# Patient Record
Sex: FEMALE | Race: WHITE | Employment: OTHER | URBAN - METROPOLITAN AREA
[De-identification: names, ages, dates, MRNs, and addresses within clinical notes are randomized per-mention and may not be internally consistent; named-entity substitution may affect disease eponyms.]

---

## 2017-01-04 ENCOUNTER — HOSPITAL ENCOUNTER (OUTPATIENT)
Dept: PHYSICAL THERAPY | Age: 67
Discharge: HOME | End: 2017-01-04
Admitting: ORTHOPAEDIC SURGERY

## 2017-01-24 ENCOUNTER — OFFICE VISIT (OUTPATIENT)
Dept: ORTHOPEDIC SURGERY | Age: 67
End: 2017-01-24

## 2017-01-24 VITALS
DIASTOLIC BLOOD PRESSURE: 78 MMHG | HEIGHT: 63 IN | SYSTOLIC BLOOD PRESSURE: 133 MMHG | HEART RATE: 80 BPM | BODY MASS INDEX: 28.71 KG/M2 | WEIGHT: 162.04 LBS

## 2017-01-24 DIAGNOSIS — M17.11 PRIMARY OSTEOARTHRITIS OF RIGHT KNEE: ICD-10-CM

## 2017-01-24 DIAGNOSIS — Z98.890 STATUS POST ARTHROSCOPY OF RIGHT KNEE: Primary | ICD-10-CM

## 2017-01-24 PROCEDURE — 99024 POSTOP FOLLOW-UP VISIT: CPT | Performed by: PHYSICIAN ASSISTANT

## 2017-01-25 ENCOUNTER — HOSPITAL ENCOUNTER (OUTPATIENT)
Dept: PHYSICAL THERAPY | Age: 67
Discharge: HOME | End: 2017-01-25
Admitting: ORTHOPAEDIC SURGERY

## 2017-02-01 ENCOUNTER — HOSPITAL ENCOUNTER (OUTPATIENT)
Dept: PHYSICAL THERAPY | Age: 67
Discharge: HOME | End: 2017-02-01
Admitting: ORTHOPAEDIC SURGERY

## 2017-02-08 ENCOUNTER — HOSPITAL ENCOUNTER (OUTPATIENT)
Dept: PHYSICAL THERAPY | Age: 67
Discharge: HOME | End: 2017-02-08
Admitting: ORTHOPAEDIC SURGERY

## 2017-02-15 ENCOUNTER — HOSPITAL ENCOUNTER (OUTPATIENT)
Dept: PHYSICAL THERAPY | Age: 67
Discharge: HOME | End: 2017-02-15
Admitting: ORTHOPAEDIC SURGERY

## 2017-02-22 ENCOUNTER — HOSPITAL ENCOUNTER (OUTPATIENT)
Dept: PHYSICAL THERAPY | Age: 67
Discharge: HOME | End: 2017-02-22
Admitting: ORTHOPAEDIC SURGERY

## 2017-02-28 ENCOUNTER — OFFICE VISIT (OUTPATIENT)
Dept: ORTHOPEDIC SURGERY | Age: 67
End: 2017-02-28

## 2017-02-28 VITALS — BODY MASS INDEX: 28.71 KG/M2 | WEIGHT: 162.04 LBS | HEIGHT: 63 IN

## 2017-02-28 DIAGNOSIS — M76.51 PATELLAR TENDINITIS OF RIGHT KNEE: Primary | ICD-10-CM

## 2017-02-28 DIAGNOSIS — M17.11 PRIMARY OSTEOARTHRITIS OF RIGHT KNEE: ICD-10-CM

## 2017-02-28 PROCEDURE — 20611 DRAIN/INJ JOINT/BURSA W/US: CPT | Performed by: PHYSICIAN ASSISTANT

## 2017-02-28 PROCEDURE — 99024 POSTOP FOLLOW-UP VISIT: CPT | Performed by: PHYSICIAN ASSISTANT

## 2017-02-28 RX ORDER — DEXAMETHASONE SODIUM PHOSPHATE 4 MG/ML
INJECTION, SOLUTION INTRA-ARTICULAR; INTRALESIONAL; INTRAMUSCULAR; INTRAVENOUS; SOFT TISSUE
Qty: 30 ML | Refills: 0 | Status: SHIPPED | OUTPATIENT
Start: 2017-02-28 | End: 2018-04-17 | Stop reason: ALTCHOICE

## 2017-03-03 ENCOUNTER — HOSPITAL ENCOUNTER (OUTPATIENT)
Dept: PHYSICAL THERAPY | Age: 67
Discharge: HOME | End: 2017-03-03
Admitting: ORTHOPAEDIC SURGERY

## 2017-03-08 ENCOUNTER — HOSPITAL ENCOUNTER (OUTPATIENT)
Dept: PHYSICAL THERAPY | Age: 67
Discharge: HOME | End: 2017-03-08
Admitting: ORTHOPAEDIC SURGERY

## 2017-03-15 ENCOUNTER — HOSPITAL ENCOUNTER (OUTPATIENT)
Dept: PHYSICAL THERAPY | Age: 67
Discharge: HOME | End: 2017-03-15
Admitting: ORTHOPAEDIC SURGERY

## 2017-06-20 ENCOUNTER — TELEPHONE (OUTPATIENT)
Dept: ORTHOPEDIC SURGERY | Age: 67
End: 2017-06-20

## 2018-04-17 ENCOUNTER — OFFICE VISIT (OUTPATIENT)
Dept: ORTHOPEDIC SURGERY | Age: 68
End: 2018-04-17

## 2018-04-17 VITALS — BODY MASS INDEX: 28.71 KG/M2 | WEIGHT: 162.04 LBS | HEIGHT: 63 IN

## 2018-04-17 DIAGNOSIS — M19.079 ARTHRITIS OF MIDFOOT: ICD-10-CM

## 2018-04-17 DIAGNOSIS — M79.671 FOOT PAIN, RIGHT: Primary | ICD-10-CM

## 2018-04-17 PROCEDURE — 1090F PRES/ABSN URINE INCON ASSESS: CPT | Performed by: PHYSICIAN ASSISTANT

## 2018-04-17 PROCEDURE — 4040F PNEUMOC VAC/ADMIN/RCVD: CPT | Performed by: PHYSICIAN ASSISTANT

## 2018-04-17 PROCEDURE — G8400 PT W/DXA NO RESULTS DOC: HCPCS | Performed by: PHYSICIAN ASSISTANT

## 2018-04-17 PROCEDURE — 1036F TOBACCO NON-USER: CPT | Performed by: PHYSICIAN ASSISTANT

## 2018-04-17 PROCEDURE — 99213 OFFICE O/P EST LOW 20 MIN: CPT | Performed by: PHYSICIAN ASSISTANT

## 2018-04-17 PROCEDURE — G8419 CALC BMI OUT NRM PARAM NOF/U: HCPCS | Performed by: PHYSICIAN ASSISTANT

## 2018-04-17 PROCEDURE — 3014F SCREEN MAMMO DOC REV: CPT | Performed by: PHYSICIAN ASSISTANT

## 2018-04-17 PROCEDURE — 3017F COLORECTAL CA SCREEN DOC REV: CPT | Performed by: PHYSICIAN ASSISTANT

## 2018-04-17 PROCEDURE — G8427 DOCREV CUR MEDS BY ELIG CLIN: HCPCS | Performed by: PHYSICIAN ASSISTANT

## 2018-04-17 PROCEDURE — 1123F ACP DISCUSS/DSCN MKR DOCD: CPT | Performed by: PHYSICIAN ASSISTANT

## 2018-04-17 PROCEDURE — L4361 PNEUMA/VAC WALK BOOT PRE OTS: HCPCS | Performed by: PHYSICIAN ASSISTANT

## 2018-04-17 RX ORDER — HYDROCHLOROTHIAZIDE 25 MG/1
25 TABLET ORAL DAILY
COMMUNITY
Start: 2018-02-19

## 2018-04-17 RX ORDER — MELOXICAM 7.5 MG/1
TABLET ORAL
COMMUNITY
Start: 2018-04-10 | End: 2019-05-29

## 2018-05-08 ENCOUNTER — OFFICE VISIT (OUTPATIENT)
Dept: ORTHOPEDIC SURGERY | Age: 68
End: 2018-05-08

## 2018-05-08 VITALS — HEART RATE: 91 BPM | SYSTOLIC BLOOD PRESSURE: 135 MMHG | DIASTOLIC BLOOD PRESSURE: 75 MMHG

## 2018-05-08 DIAGNOSIS — M25.571 ACUTE RIGHT ANKLE PAIN: Primary | ICD-10-CM

## 2018-05-08 PROCEDURE — G8400 PT W/DXA NO RESULTS DOC: HCPCS | Performed by: ORTHOPAEDIC SURGERY

## 2018-05-08 PROCEDURE — 4040F PNEUMOC VAC/ADMIN/RCVD: CPT | Performed by: ORTHOPAEDIC SURGERY

## 2018-05-08 PROCEDURE — 1123F ACP DISCUSS/DSCN MKR DOCD: CPT | Performed by: ORTHOPAEDIC SURGERY

## 2018-05-08 PROCEDURE — L1902 AFO ANKLE GAUNTLET PRE OTS: HCPCS | Performed by: ORTHOPAEDIC SURGERY

## 2018-05-08 PROCEDURE — 1036F TOBACCO NON-USER: CPT | Performed by: ORTHOPAEDIC SURGERY

## 2018-05-08 PROCEDURE — 3017F COLORECTAL CA SCREEN DOC REV: CPT | Performed by: ORTHOPAEDIC SURGERY

## 2018-05-08 PROCEDURE — 99214 OFFICE O/P EST MOD 30 MIN: CPT | Performed by: ORTHOPAEDIC SURGERY

## 2018-05-08 PROCEDURE — 1090F PRES/ABSN URINE INCON ASSESS: CPT | Performed by: ORTHOPAEDIC SURGERY

## 2018-05-08 PROCEDURE — G8428 CUR MEDS NOT DOCUMENT: HCPCS | Performed by: ORTHOPAEDIC SURGERY

## 2018-05-08 PROCEDURE — G8419 CALC BMI OUT NRM PARAM NOF/U: HCPCS | Performed by: ORTHOPAEDIC SURGERY

## 2019-05-29 ENCOUNTER — OFFICE VISIT (OUTPATIENT)
Dept: ORTHOPEDIC SURGERY | Age: 69
End: 2019-05-29
Payer: MEDICARE

## 2019-05-29 VITALS
DIASTOLIC BLOOD PRESSURE: 79 MMHG | BODY MASS INDEX: 28.71 KG/M2 | SYSTOLIC BLOOD PRESSURE: 137 MMHG | HEIGHT: 63 IN | WEIGHT: 162.04 LBS | HEART RATE: 93 BPM

## 2019-05-29 DIAGNOSIS — M79.671 FOOT PAIN, RIGHT: Primary | ICD-10-CM

## 2019-05-29 PROCEDURE — L3170 FOOT PLAS HEEL STABI PRE OTS: HCPCS | Performed by: ORTHOPAEDIC SURGERY

## 2019-05-29 PROCEDURE — 1090F PRES/ABSN URINE INCON ASSESS: CPT | Performed by: ORTHOPAEDIC SURGERY

## 2019-05-29 PROCEDURE — G8419 CALC BMI OUT NRM PARAM NOF/U: HCPCS | Performed by: ORTHOPAEDIC SURGERY

## 2019-05-29 PROCEDURE — G8400 PT W/DXA NO RESULTS DOC: HCPCS | Performed by: ORTHOPAEDIC SURGERY

## 2019-05-29 PROCEDURE — 1123F ACP DISCUSS/DSCN MKR DOCD: CPT | Performed by: ORTHOPAEDIC SURGERY

## 2019-05-29 PROCEDURE — 3017F COLORECTAL CA SCREEN DOC REV: CPT | Performed by: ORTHOPAEDIC SURGERY

## 2019-05-29 PROCEDURE — G8428 CUR MEDS NOT DOCUMENT: HCPCS | Performed by: ORTHOPAEDIC SURGERY

## 2019-05-29 PROCEDURE — 1036F TOBACCO NON-USER: CPT | Performed by: ORTHOPAEDIC SURGERY

## 2019-05-29 PROCEDURE — 4040F PNEUMOC VAC/ADMIN/RCVD: CPT | Performed by: ORTHOPAEDIC SURGERY

## 2019-05-29 PROCEDURE — 99213 OFFICE O/P EST LOW 20 MIN: CPT | Performed by: ORTHOPAEDIC SURGERY

## 2019-05-29 RX ORDER — MELOXICAM 15 MG/1
15 TABLET ORAL DAILY
Qty: 30 TABLET | Refills: 2 | Status: SHIPPED | OUTPATIENT
Start: 2019-05-29 | End: 2019-08-20 | Stop reason: SDUPTHER

## 2019-06-06 ENCOUNTER — HOSPITAL ENCOUNTER (OUTPATIENT)
Dept: PHYSICAL THERAPY | Age: 69
Setting detail: THERAPIES SERIES
Discharge: HOME | End: 2019-06-06
Payer: MEDICARE

## 2019-06-06 PROCEDURE — 97110 THERAPEUTIC EXERCISES: CPT | Performed by: PHYSICAL THERAPIST

## 2019-06-06 PROCEDURE — 97161 PT EVAL LOW COMPLEX 20 MIN: CPT | Performed by: PHYSICAL THERAPIST

## 2019-06-06 PROCEDURE — G0283 ELEC STIM OTHER THAN WOUND: HCPCS | Performed by: PHYSICAL THERAPIST

## 2019-06-06 NOTE — PLAN OF CARE
[address] [facility] and [address] [place] [address] [facility] Marlee Bravo, [address] [facility] [address]  Phone: [phone]   Fax:     [phone]       Authur Pollen    Dear  Dr Prema Santiago,    We had the pleasure of evaluating the following patient for physical therapy services at [address] [place] [address]. A summary of our findings can be found in the initial assessment below. This includes our plan of care. If you have any questions or concerns regarding these findings, please do not hesitate to contact me at the office phone number checked above.   Thank you for the referral.       Physician Signature:_______________________________Date:__________________  By signing above (or electronic signature), therapists plan is approved by physician      Patient: Rehana Belcher   : 1950   MRN: [id number]  Referring Physician:  Prema Santiago      Evaluation Date: 2019      Medical Diagnosis Information:    M79.671 (ICD-10-CM) - Foot pain, right                                             Insurance information:  [facility]     Precautions/ Contra-indications: none  Latex Allergy:  [x]NO      []YES  Preferred Language for Healthcare:   [x]English       []other:    SUBJECTIVE: Patient stated complaint:  R heel pain  For several months and not getting better    Relevant Medical History:none  Functional Disability Index: lefs 70%    Pain Scale: 5-8/10  Easing factors: visco heels are helping a lot, NWB not moving  Provocative factors: walking, steps, incline, DF     Type: []Constant   [x]Intermittent  []Radiating []Localized []other:     Numbness/Tingling: none    Occupation/School:retired    Living Status/Prior Level of Function: Independent with ADLs and IADLs, walking, bicycle    OBJECTIVE:     AROM LEFT RIGHT   HIP Flex     HIP Abd     HIP Ext     HIP IR     HIP ER     Knee ext     Knee Flex     Ankle PF  50 []Other:   []Other:          Barriers to/and or personal factors that will affect rehab potential:              []Age  []Sex              []Motivation/Lack of Motivation                        []Co-Morbidities              []Cognitive Function, education/learning barriers              []Environmental, home barriers              []profession/work barriers  []past PT/medical experience  []other:  Justification:     Falls Risk Assessment (30 days): 0  [x] Falls Risk assessed and no intervention required. [] Falls Risk assessed and Patient requires intervention due to being higher risk   TUG score (>12s at risk):     [] Falls education provided, including       G-Codes:       ASSESSMENT: pain with walking has decreased with visco heels.    Functional Impairments:     []Noted lumbar/proximal hip/LE joint hypomobility   [x]Decreased LE functional ROM   []Decreased core/proximal hip strength and neuromuscular control   []Decreased LE functional strength   [x]Reduced balance/proprioceptive control   []other:      Functional Activity Limitations (from functional questionnaire and intake)   []Reduced ability to tolerate prolonged functional positions   []Reduced ability or difficulty with changes of positions or transfers between positions   []Reduced ability to maintain good posture and demonstrate good body mechanics with sitting, bending, and lifting   []Reduced ability to sleep   [] Reduced ability or tolerance with driving and/or computer work   []Reduced ability to perform lifting, carrying tasks   []Reduced ability to squat   []Reduced ability to forward bend   [x]Reduced ability to ambulate prolonged functional periods/distances/surfaces   [x]Reduced ability to ascend/descend stairs   []Reduced ability to run, hop, cut or jump   []other:    Participation Restrictions   []Reduced participation in self care activities   [x]Reduced participation in home management activities   []Reduced participation in work activities   [x]Reduced participation in social activities. [x]Reduced participation in sport/recreation activities. Classification :    []Signs/symptoms consistent with post-surgical status including decreased ROM, strength and function. []Signs/symptoms consistent with joint sprain/strain   []Signs/symptoms consistent with patella-femoral syndrome   []Signs/symptoms consistent with knee OA/hip OA   []Signs/symptoms consistent with internal derangement of knee/Hip   []Signs/symptoms consistent with functional hip weakness/NMR control      [x]Signs/symptoms consistent with tendinitis/tendinosis    []signs/symptoms consistent with pathology which may benefit from Dry needling      []other:      Prognosis/Rehab Potential:      []Excellent   [x]Good    []Fair   []Poor    Tolerance of evaluation/treatment:    []Excellent   [x]Good    []Fair   []Poor    Physical Therapy Evaluation Complexity Justification  [x] A history of present problem with:  [x] no personal factors and/or comorbidities that impact the plan of care;  []1-2 personal factors and/or comorbidities that impact the plan of care  []3 personal factors and/or comorbidities that impact the plan of care  [x] An examination of body systems using standardized tests and measures addressing any of the following: body structures and functions (impairments), activity limitations, and/or participation restrictions;:  [] a total of 1-2 or more elements   [x] a total of 3 or more elements   [] a total of 4 or more elements   [x] A clinical presentation with:  [x] stable and/or uncomplicated characteristics   [] evolving clinical presentation with changing characteristics  [] unstable and unpredictable characteristics;   [x] Clinical decision making of [x] low, [] moderate, [] high complexity using standardized patient assessment instrument and/or measurable assessment of functional outcome.     [x] EVAL (LOW) 73392 (typically 20 minutes face-to-face)  [] EVAL (MOD) 66675 (typically 30 minutes face-to-face)  [] EVAL (HIGH) 10163 (typically 45 minutes face-to-face)  [] RE-EVAL     PLAN:   Frequency/Duration:  1 days per week for 10 Weeks:  Interventions:  [x]  Therapeutic exercise including: strength training, ROM, for Lower extremity and core   [x]  NMR activation and proprioception for LE, Glutes and Core   [x]  Manual therapy as indicated for LE, Hip and spine to include: Dry Needling/IASTM, STM, PROM, Gr I-IV mobilizations, manipulation. [x] Modalities as needed that may include: thermal agents, E-stim, Biofeedback, US, iontophoresis as indicated  [x] Patient education on joint protection, postural re-education, activity modification, progression of HEP. HEP instruction: (see scanned forms)    GOALS:  Patient stated goal: no pain    Therapist goals for Patient:   Short Term Goals: To be achieved in: 2 weeks  1. Independent in HEP and progression per patient tolerance, in order to prevent re-injury. 2. Patient will have a decrease in pain to facilitate improvement in movement, function, and ADLs as indicated by Functional Deficits. Long Term Goals: To be achieved in: 10 weeks  1. Disability index score of 30% or less for the LEFS to assist with reaching prior level of function. 2. Patient will demonstrate increased AROM to +10 degrees DF to allow for proper joint functioning as indicated by patients Functional Deficits. 3. Patient will demonstrate an increase in Strength to good proximal hip strength and control, within 5/5 MMT in LE to allow for proper functional mobility as indicated by patients Functional Deficits. 4. Patient will return to full functional activities without increased symptoms or restriction.    5. Pain free gait 100% of time(patient specific functional goal)       Electronically signed by:  Loulou Weaver, PT

## 2019-06-06 NOTE — FLOWSHEET NOTE
activities related to improving balance, coordination, kinesthetic sense, posture, motor skill, proprioception and motor activation to allow for proper function of core, proximal hip and LE with self care and ADLs  [] (81652) Gait Re-education- Provided training and instruction to the patient for proper LE, core and proximal hip recruitment and positioning and eccentric body weight control with ambulation re-education including up and down stairs     Home Exercise Program:    [x] (54419) Reviewed/Progressed HEP activities related to strengthening, flexibility, endurance, ROM of core, proximal hip and LE for functional self-care, mobility, lifting and ambulation/stair navigation   [] (19802)Reviewed/Progressed HEP activities related to improving balance, coordination, kinesthetic sense, posture, motor skill, proprioception of core, proximal hip and LE for self care, mobility, lifting, and ambulation/stair navigation      Manual Treatments:  PROM / STM / Oscillations-Mobs:  G-I, II, III, IV (PA's, Inf., Post.)  [] (02005) Provided manual therapy to mobilize LE, proximal hip and/or LS spine soft tissue/joints for the purpose of modulating pain, promoting relaxation,  increasing ROM, reducing/eliminating soft tissue swelling/inflammation/restriction, improving soft tissue extensibility and allowing for proper ROM for normal function with self care, mobility, lifting and ambulation. Modalities:  hivolt CP 10m    Charges:  Timed Code Treatment Minutes: 15   Total Treatment Minutes: 45     [x] EVAL (LOW) 78890 (typically 20 minutes face-to-face)    [x] KT(68425) x      [] IONTO  [] NMR (01797) x      [] VASO  [] Manual (80874) x       [] Other:  [] TA x       [] Mech Traction (41508)  [] ES(attended) (13243)      [x] ES (un) (20414):     GOALS: Patient stated goal: no pain     Therapist goals for Patient:   Short Term Goals: To be achieved in: 2 weeks  1.  Independent in HEP and progression per patient tolerance, in order to prevent re-injury. 2. Patient will have a decrease in pain to facilitate improvement in movement, function, and ADLs as indicated by Functional Deficits.     Long Term Goals: To be achieved in: 10 weeks  1. Disability index score of 30% or less for the LEFS to assist with reaching prior level of function. 2. Patient will demonstrate increased AROM to +10 degrees DF to allow for proper joint functioning as indicated by patients Functional Deficits. 3. Patient will demonstrate an increase in Strength to good proximal hip strength and control, within 5/5 MMT in LE to allow for proper functional mobility as indicated by patients Functional Deficits. 4. Patient will return to full functional activities without increased symptoms or restriction. 5. Pain free gait 100% of time(patient specific functional goal)                Progression Towards Functional goals:  [] Patient is progressing as expected towards functional goals listed. [] Progression is slowed due to complexities listed. [] Progression has been slowed due to co-morbidities.   [x] Plan just implemented, too soon to assess goals progression  [] Other:     ASSESSMENT:  See eval    Treatment/Activity Tolerance:  [x] Patient tolerated treatment well [] Patient limited by fatique  [] Patient limited by pain  [] Patient limited by other medical complications  [] Other:     Prognosis: [] Good [] Fair  [] Poor    Patient Requires Follow-up: [x] Yes  [] No    PLAN: See eval  [] Continue per plan of care [] Alter current plan (see comments)  [x] Plan of care initiated [] Hold pending MD visit [] Discharge    Electronically signed by: Heidi Bullard PT

## 2019-06-10 ENCOUNTER — HOSPITAL ENCOUNTER (OUTPATIENT)
Dept: PHYSICAL THERAPY | Age: 69
Setting detail: THERAPIES SERIES
Discharge: HOME | End: 2019-06-10
Payer: MEDICARE

## 2019-06-10 PROCEDURE — 97110 THERAPEUTIC EXERCISES: CPT | Performed by: PHYSICAL THERAPIST

## 2019-06-10 NOTE — FLOWSHEET NOTE
[facility]    Physical Therapy Daily Treatment Note  Date:  6/10/2019    Patient Name:  Yvonne Lewis    :  1950  MRN: [id number]  Restrictions/Precautions:    Medical/Treatment Diagnosis Information:  ·   M79.671 (ICD-10-CM) - Foot pain, right  ·  achilles tendonitis  Insurance/Certification information:   [facility]  Physician Information:   [id number] of care signed (Y/N):     Date of Patient follow up with Physician:     G-Code (if applicable):      Date G-Code Applied:     lefs 70%    Progress Note: [x]  Yes  []  No  Next due by: Visit #10       Latex Allergy:  [x]NO      []YES  Preferred Language for Healthcare:   [x]English       []other:    Visit # Insurance Allowable   2 [facility]     Pain level:  5-810     SUBJECTIVE: sore. Thinks she may have over done the ex    OBJECTIVE: See eval  Observation:   Test measurements:      RESTRICTIONS/PRECAUTIONS: none    Exercises/Interventions:     Therapeutic Ex Sets/sec Reps Notes HEP   Belt stretch G/S  SLR  SSLR  Ankle TB 4 way       purple 3x30s  x25  x20  x15 ea       Max cues ankle only                                                                                                                 Manual Intervention                                                 NMR re-education                                                                          Therapeutic Exercise and NMR EXR  [x] (81380) Provided verbal/tactile cueing for activities related to strengthening, flexibility, endurance, ROM for improvements in LE, proximal hip, and core control with self care, mobility, lifting, ambulation.  [] (47516) Provided verbal/tactile cueing for activities related to improving balance, coordination, kinesthetic sense, posture, motor skill, proprioception  to assist with LE, proximal hip, and core control in self care, mobility, lifting, ambulation and eccentric single leg control.      NMR and Therapeutic Activities:    [] (82156 or 04389) Provided verbal/tactile cueing for activities related to improving balance, coordination, kinesthetic sense, posture, motor skill, proprioception and motor activation to allow for proper function of core, proximal hip and LE with self care and ADLs  [] (20156) Gait Re-education- Provided training and instruction to the patient for proper LE, core and proximal hip recruitment and positioning and eccentric body weight control with ambulation re-education including up and down stairs     Home Exercise Program:    [x] (86318) Reviewed/Progressed HEP activities related to strengthening, flexibility, endurance, ROM of core, proximal hip and LE for functional self-care, mobility, lifting and ambulation/stair navigation   [] (54065)Reviewed/Progressed HEP activities related to improving balance, coordination, kinesthetic sense, posture, motor skill, proprioception of core, proximal hip and LE for self care, mobility, lifting, and ambulation/stair navigation      Manual Treatments:  PROM / STM / Oscillations-Mobs:  G-I, II, III, IV (PA's, Inf., Post.)  [] (67203) Provided manual therapy to mobilize LE, proximal hip and/or LS spine soft tissue/joints for the purpose of modulating pain, promoting relaxation,  increasing ROM, reducing/eliminating soft tissue swelling/inflammation/restriction, improving soft tissue extensibility and allowing for proper ROM for normal function with self care, mobility, lifting and ambulation. Modalities:  has home TENS unit    Charges:  Timed Code Treatment Minutes: 30   Total Treatment Minutes: 45     [] EVAL (LOW) 42453 (typically 20 minutes face-to-face)    [x] IX(00999) x  2   [] IONTO  [] NMR (36674) x      [] VASO  [] Manual (57637) x       [] Other:  [] TA x       [] Mech Traction (10119)  [] ES(attended) (21757)      [] ES (un) (57985):     GOALS: Patient stated goal: no pain     Therapist goals for Patient:   Short Term Goals:  To be achieved in: 2 weeks  1. Independent in HEP and progression per patient tolerance, in order to prevent re-injury. 2. Patient will have a decrease in pain to facilitate improvement in movement, function, and ADLs as indicated by Functional Deficits.     Long Term Goals: To be achieved in: 10 weeks  1. Disability index score of 30% or less for the LEFS to assist with reaching prior level of function. 2. Patient will demonstrate increased AROM to +10 degrees DF to allow for proper joint functioning as indicated by patients Functional Deficits. 3. Patient will demonstrate an increase in Strength to good proximal hip strength and control, within 5/5 MMT in LE to allow for proper functional mobility as indicated by patients Functional Deficits. 4. Patient will return to full functional activities without increased symptoms or restriction. 5. Pain free gait 100% of time(patient specific functional goal)                Progression Towards Functional goals:  [x] Patient is progressing as expected towards functional goals listed. [] Progression is slowed due to complexities listed. [] Progression has been slowed due to co-morbidities.   [] Plan just implemented, too soon to assess goals progression  [] Other:     ASSESSMENT:  Cues not to over do ex and not to roll leg with band ex    Treatment/Activity Tolerance:  [x] Patient tolerated treatment well [] Patient limited by fatique  [] Patient limited by pain  [] Patient limited by other medical complications  [] Other:     Prognosis: [x] Good [] Fair  [] Poor    Patient Requires Follow-up: [x] Yes  [] No    PLAN:   [x] Continue per plan of care [] Alter current plan (see comments)  [] Plan of care initiated [] Hold pending MD visit [] Discharge    Electronically signed by: Ronaldo Lopez PT

## 2019-06-26 ENCOUNTER — APPOINTMENT (OUTPATIENT)
Dept: PHYSICAL THERAPY | Age: 69
End: 2019-06-26
Payer: MEDICARE

## 2019-07-01 ENCOUNTER — HOSPITAL ENCOUNTER (OUTPATIENT)
Dept: PHYSICAL THERAPY | Age: 69
Setting detail: THERAPIES SERIES
Discharge: HOME | End: 2019-07-01
Payer: MEDICARE

## 2019-07-01 PROCEDURE — 97110 THERAPEUTIC EXERCISES: CPT

## 2019-07-01 PROCEDURE — 97112 NEUROMUSCULAR REEDUCATION: CPT

## 2019-07-17 ENCOUNTER — OFFICE VISIT (OUTPATIENT)
Dept: ORTHOPEDIC SURGERY | Age: 69
End: 2019-07-17
Payer: MEDICARE

## 2019-07-17 VITALS — WEIGHT: 162.04 LBS | HEIGHT: 63 IN | BODY MASS INDEX: 28.71 KG/M2

## 2019-07-17 DIAGNOSIS — M79.671 FOOT PAIN, RIGHT: Primary | ICD-10-CM

## 2019-07-17 PROCEDURE — 4040F PNEUMOC VAC/ADMIN/RCVD: CPT | Performed by: ORTHOPAEDIC SURGERY

## 2019-07-17 PROCEDURE — 1036F TOBACCO NON-USER: CPT | Performed by: ORTHOPAEDIC SURGERY

## 2019-07-17 PROCEDURE — G8400 PT W/DXA NO RESULTS DOC: HCPCS | Performed by: ORTHOPAEDIC SURGERY

## 2019-07-17 PROCEDURE — 3017F COLORECTAL CA SCREEN DOC REV: CPT | Performed by: ORTHOPAEDIC SURGERY

## 2019-07-17 PROCEDURE — G8428 CUR MEDS NOT DOCUMENT: HCPCS | Performed by: ORTHOPAEDIC SURGERY

## 2019-07-17 PROCEDURE — 99212 OFFICE O/P EST SF 10 MIN: CPT | Performed by: ORTHOPAEDIC SURGERY

## 2019-07-17 PROCEDURE — 1090F PRES/ABSN URINE INCON ASSESS: CPT | Performed by: ORTHOPAEDIC SURGERY

## 2019-07-17 PROCEDURE — 1123F ACP DISCUSS/DSCN MKR DOCD: CPT | Performed by: ORTHOPAEDIC SURGERY

## 2019-07-17 PROCEDURE — G8419 CALC BMI OUT NRM PARAM NOF/U: HCPCS | Performed by: ORTHOPAEDIC SURGERY

## 2019-08-14 ENCOUNTER — OFFICE VISIT (OUTPATIENT)
Dept: ORTHOPEDIC SURGERY | Age: 69
End: 2019-08-14
Payer: MEDICARE

## 2019-08-14 VITALS — HEIGHT: 63 IN | WEIGHT: 162.04 LBS | BODY MASS INDEX: 28.71 KG/M2

## 2019-08-14 DIAGNOSIS — M79.671 FOOT PAIN, RIGHT: Primary | ICD-10-CM

## 2019-08-14 PROCEDURE — 99212 OFFICE O/P EST SF 10 MIN: CPT | Performed by: ORTHOPAEDIC SURGERY

## 2019-08-14 PROCEDURE — G8419 CALC BMI OUT NRM PARAM NOF/U: HCPCS | Performed by: ORTHOPAEDIC SURGERY

## 2019-08-14 PROCEDURE — 1036F TOBACCO NON-USER: CPT | Performed by: ORTHOPAEDIC SURGERY

## 2019-08-14 PROCEDURE — G8428 CUR MEDS NOT DOCUMENT: HCPCS | Performed by: ORTHOPAEDIC SURGERY

## 2019-08-14 PROCEDURE — 1123F ACP DISCUSS/DSCN MKR DOCD: CPT | Performed by: ORTHOPAEDIC SURGERY

## 2019-08-14 PROCEDURE — G8400 PT W/DXA NO RESULTS DOC: HCPCS | Performed by: ORTHOPAEDIC SURGERY

## 2019-08-14 PROCEDURE — 4040F PNEUMOC VAC/ADMIN/RCVD: CPT | Performed by: ORTHOPAEDIC SURGERY

## 2019-08-14 PROCEDURE — 1090F PRES/ABSN URINE INCON ASSESS: CPT | Performed by: ORTHOPAEDIC SURGERY

## 2019-08-14 PROCEDURE — 3017F COLORECTAL CA SCREEN DOC REV: CPT | Performed by: ORTHOPAEDIC SURGERY

## 2019-08-20 RX ORDER — MELOXICAM 15 MG/1
TABLET ORAL
Qty: 30 TABLET | Refills: 2 | Status: SHIPPED | OUTPATIENT
Start: 2019-08-20 | End: 2019-11-22 | Stop reason: SDUPTHER

## 2019-11-22 RX ORDER — MELOXICAM 15 MG/1
TABLET ORAL
Qty: 30 TABLET | Refills: 2 | Status: SHIPPED | OUTPATIENT
Start: 2019-11-22 | End: 2022-09-23

## 2020-07-14 ENCOUNTER — TELEPHONE (OUTPATIENT)
Dept: ORTHOPEDIC SURGERY | Age: 70
End: 2020-07-14

## 2022-09-19 NOTE — DISCHARGE INSTRUCTIONS
[facility]  [address], [address]  [place], [facility]  Telephone: [phone]      Fax: [phone]      Your home care company:  N/a    Your wound-care supplies will be provided by:  Patient    NAME:  Moisés Miller   YOB: 1950  PRIMARY DIAGNOSIS FOR WOUND CARE CENTER:  Non-healing surgical wound    Wound cleansing:   Do not scrub or use excessive force. Wash hands with soap and water before and after dressing changes. Prior to applying a clean dressing, cleanse wound with normal saline, wound cleanser, or mild soap and water. Ask your physician or nurse before getting the wound(s) wet in the shower. Wound care for home:    Right Achilles Wound:  Vashe to wound after cleansing wound spray 2-3 sprays into wound let sit about 60 seconds, do not rinse then apply your dressing   Mix a small dot of our TRIAD we gave you with your antibiotic ointment to your wound  Cover with a dry dressing (no adhesives)  ACE wrap to help keep dressing in place  Change dressing every day    Please note, all wounds (unless stated otherwise here) were mechanically debrided at the time of cleansing here in the wound-care center today, so a small amount of pain, drainage or bleeding from that process might be expected, and is normal.     All products for home use, including multiple products for a single wound if applicable, are medically necessary in order to achieve the best chance at timely wound healing. See provider documentation for details if needed.     Substituted dressings applied in the [facility] today, if applicable:    N/a    New orders for this week (labs, imaging, medications, etc.):    STOP AT THE [facility]ACY [facility] AND PURCHASE A BOTTLE OF VASHE AND A SPRAY BOTTLE    BRING IN YOUR OFFLOADING BOOT TO YOUR NEXT APPOINTMENT      Additional instructions for specific diagnoses:    N/a        F/U Appointment is with Dr. Isaias Granados in 1 week, on                                   at                       .     Your nurse  is Lukas Nevarez RN     If we applied slip-resistant hospital socks today, be sure to remove them at least once a day to inspect your toes or feet, even if you're not changing the wraps or dressings underneath. If you see anything concerning (redness, excess moisture, etc), please call and let us know right away. Should you experience any significant changes in your wound(s) (including redness, increased warmth, increased pain, increased drainage, odor, or fever) or have questions about your wound care, please contact the [address] [place] [address] at [phone] Monday-Thursday from 8:00 am - 4:30 pm, or Friday from 8:00 am - 2:30 pm.  If you need help with your wound outside these hours and cannot wait until we are again available, contact your home-care company (if applicable), your PCP, or go to the nearest emergency room.

## 2022-09-22 PROBLEM — M65.331 TRIGGER MIDDLE FINGER OF RIGHT HAND: Status: ACTIVE | Noted: 2020-08-28

## 2022-09-22 PROBLEM — M19.90 LOCALIZED OSTEOARTHROSIS: Status: ACTIVE | Noted: 2020-08-10

## 2022-09-22 PROBLEM — K13.79 RECURRENT ORAL ULCERS: Status: ACTIVE | Noted: 2020-04-14

## 2022-09-22 PROBLEM — G56.01 CARPAL TUNNEL SYNDROME, RIGHT: Status: ACTIVE | Noted: 2022-09-22

## 2022-09-22 PROBLEM — E66.3 OVERWEIGHT (BMI 25.0-29.9): Status: ACTIVE | Noted: 2018-08-29

## 2022-09-23 ENCOUNTER — HOSPITAL ENCOUNTER (OUTPATIENT)
Dept: WOUND CARE | Age: 72
Discharge: HOME | End: 2022-09-23
Payer: MEDICARE

## 2022-09-23 VITALS
SYSTOLIC BLOOD PRESSURE: 151 MMHG | HEIGHT: 63 IN | BODY MASS INDEX: 30.72 KG/M2 | TEMPERATURE: 97.8 F | RESPIRATION RATE: 18 BRPM | DIASTOLIC BLOOD PRESSURE: 73 MMHG | WEIGHT: 173.4 LBS | HEART RATE: 101 BPM

## 2022-09-23 DIAGNOSIS — I10 ESSENTIAL HYPERTENSION: ICD-10-CM

## 2022-09-23 DIAGNOSIS — Z14.8 HEMOCHROMATOSIS CARRIER: ICD-10-CM

## 2022-09-23 DIAGNOSIS — G47.00 INSOMNIA WITH SLEEP APNEA: ICD-10-CM

## 2022-09-23 DIAGNOSIS — T81.31XA SURGICAL WOUND DEHISCENCE, INITIAL ENCOUNTER: Primary | ICD-10-CM

## 2022-09-23 DIAGNOSIS — G47.30 INSOMNIA WITH SLEEP APNEA: ICD-10-CM

## 2022-09-23 PROCEDURE — 11042 DBRDMT SUBQ TIS 1ST 20SQCM/<: CPT | Performed by: INTERNAL MEDICINE

## 2022-09-23 PROCEDURE — 99203 OFFICE O/P NEW LOW 30 MIN: CPT | Performed by: INTERNAL MEDICINE

## 2022-09-23 PROCEDURE — 11042 DBRDMT SUBQ TIS 1ST 20SQCM/<: CPT

## 2022-09-23 PROCEDURE — 99213 OFFICE O/P EST LOW 20 MIN: CPT

## 2022-09-23 RX ORDER — BACITRACIN ZINC AND POLYMYXIN B SULFATE 500; 1000 [USP'U]/G; [USP'U]/G
OINTMENT TOPICAL ONCE
Status: CANCELLED | OUTPATIENT
Start: 2022-09-23 | End: 2022-09-23

## 2022-09-23 RX ORDER — LIDOCAINE 40 MG/G
CREAM TOPICAL ONCE
Status: DISCONTINUED | OUTPATIENT
Start: 2022-09-23 | End: 2022-09-25 | Stop reason: HOSPADM

## 2022-09-23 RX ORDER — LIDOCAINE 50 MG/G
OINTMENT TOPICAL ONCE
Status: CANCELLED | OUTPATIENT
Start: 2022-09-23 | End: 2022-09-23

## 2022-09-23 RX ORDER — ASPIRIN 81 MG/1
81 TABLET, CHEWABLE ORAL DAILY
COMMUNITY

## 2022-09-23 RX ORDER — GLIMEPIRIDE 1 MG/1
1 TABLET ORAL
COMMUNITY

## 2022-09-23 RX ORDER — LIDOCAINE HYDROCHLORIDE 40 MG/ML
SOLUTION TOPICAL ONCE
Status: CANCELLED | OUTPATIENT
Start: 2022-09-23 | End: 2022-09-23

## 2022-09-23 RX ORDER — LIDOCAINE 40 MG/G
CREAM TOPICAL ONCE
Status: CANCELLED | OUTPATIENT
Start: 2022-09-23 | End: 2022-09-23

## 2022-09-23 RX ORDER — IRBESARTAN 300 MG/1
300 TABLET ORAL NIGHTLY
COMMUNITY

## 2022-09-23 RX ORDER — LANOLIN ALCOHOL/MO/W.PET/CERES
1000 CREAM (GRAM) TOPICAL DAILY
COMMUNITY

## 2022-09-23 RX ORDER — PIOGLITAZONEHYDROCHLORIDE 30 MG/1
30 TABLET ORAL DAILY
COMMUNITY

## 2022-09-23 RX ORDER — GLUCOSAMINE/D3/BOSWELLIA SERRA 1500MG-400
5000 TABLET ORAL DAILY
COMMUNITY

## 2022-09-23 RX ORDER — ACETAMINOPHEN 160 MG
2000 TABLET,DISINTEGRATING ORAL DAILY
COMMUNITY

## 2022-09-23 NOTE — PLAN OF CARE
New patient presents with non healing surgical wound. Debridement per MD today after consent. MD instructed patient on plan of care and expectations with wound care. Patient will follow up in wound clinic as ordered. Discharge instructions reviewed with patient, all questions answered, copy given to patient. Dressings were applied to all wounds per M.D. Instructions at this visit.

## 2022-09-26 NOTE — DISCHARGE INSTRUCTIONS
[address] [facility] Physician Orders and Discharge 800 [facility]  [address], [address]  [place], [facility]  Telephone: [phone]      Fax: [phone]        Your home care company:   N/a     Your wound-care supplies will be provided by:  Patient     NAME:  Trav Mccarty   YOB: 1950  PRIMARY DIAGNOSIS FOR WOUND CARE CENTER:  Non-healing surgical wound     Wound cleansing:   Do not scrub or use excessive force. Wash hands with soap and water before and after dressing changes. Prior to applying a clean dressing, cleanse wound with normal saline, wound cleanser, or mild soap and water. Ask your physician or nurse before getting the wound(s) wet in the shower. Wound care for home:     Right Achilles Wound:  Vashe to wound after cleansing wound spray 2-3 sprays into wound let sit about 60 seconds, do not rinse then apply your dressing   Mix a small dot of our TRIAD we gave you last week with your antibiotic ointment to your wound  Cover with a dry dressing (no adhesives)  ACE wrap to help keep dressing in place  Change dressing every day     Please note, all wounds (unless stated otherwise here) were mechanically debrided at the time of cleansing here in the wound-care center today, so a small amount of pain, drainage or bleeding from that process might be expected, and is normal.      All products for home use, including multiple products for a single wound if applicable, are medically necessary in order to achieve the best chance at timely wound healing. See provider documentation for details if needed.      Substituted dressings applied in the [address] [place] [address] today, if applicable:     N/a     New orders for this week (labs, imaging, medications, etc.):     START WEARING YOUR OFFLOADING BOOT TODAY AND SEE HOW IT FEELS        Additional instructions for specific diagnoses:     N/a           F/U Appointment is with Dr. Scarlett Lindsey in 1 week, on at                       .     Your nurse  is Jarred Fulton RN      If we applied slip-resistant hospital socks today, be sure to remove them at least once a day to inspect your toes or feet, even if you're not changing the wraps or dressings underneath. If you see anything concerning (redness, excess moisture, etc), please call and let us know right away. Should you experience any significant changes in your wound(s) (including redness, increased warmth, increased pain, increased drainage, odor, or fever) or have questions about your wound care, please contact the [facility] at [phone] Monday-Thursday from 8:00 am - 4:30 pm, or Friday from 8:00 am - 2:30 pm.  If you need help with your wound outside these hours and cannot wait until we are again available, contact your home-care company (if applicable), your PCP, or go to the nearest emergency room.

## 2022-09-30 ENCOUNTER — HOSPITAL ENCOUNTER (OUTPATIENT)
Dept: WOUND CARE | Age: 72
Discharge: HOME | End: 2022-09-30
Payer: MEDICARE

## 2022-09-30 VITALS
SYSTOLIC BLOOD PRESSURE: 148 MMHG | DIASTOLIC BLOOD PRESSURE: 81 MMHG | WEIGHT: 171.6 LBS | HEIGHT: 63 IN | BODY MASS INDEX: 30.41 KG/M2 | HEART RATE: 101 BPM | RESPIRATION RATE: 18 BRPM | TEMPERATURE: 98 F

## 2022-09-30 DIAGNOSIS — T81.31XA SURGICAL WOUND DEHISCENCE, INITIAL ENCOUNTER: Primary | ICD-10-CM

## 2022-09-30 PROBLEM — E66.811 CLASS 1 OBESITY DUE TO EXCESS CALORIES WITH SERIOUS COMORBIDITY AND BODY MASS INDEX (BMI) OF 30.0 TO 30.9 IN ADULT: Status: ACTIVE | Noted: 2018-08-29

## 2022-09-30 PROBLEM — K13.79 RECURRENT ORAL ULCERS: Status: RESOLVED | Noted: 2020-04-14 | Resolved: 2022-09-30

## 2022-09-30 PROBLEM — E66.09 CLASS 1 OBESITY DUE TO EXCESS CALORIES WITH SERIOUS COMORBIDITY AND BODY MASS INDEX (BMI) OF 30.0 TO 30.9 IN ADULT: Status: ACTIVE | Noted: 2018-08-29

## 2022-09-30 PROCEDURE — 97597 DBRDMT OPN WND 1ST 20 CM/<: CPT | Performed by: INTERNAL MEDICINE

## 2022-09-30 PROCEDURE — 97597 DBRDMT OPN WND 1ST 20 CM/<: CPT

## 2022-09-30 RX ORDER — LIDOCAINE 40 MG/G
CREAM TOPICAL ONCE
Status: DISCONTINUED | OUTPATIENT
Start: 2022-09-30 | End: 2022-10-01 | Stop reason: HOSPADM

## 2022-09-30 RX ORDER — LIDOCAINE 40 MG/G
CREAM TOPICAL ONCE
Status: CANCELLED | OUTPATIENT
Start: 2022-09-30 | End: 2022-09-30

## 2022-09-30 RX ORDER — LIDOCAINE 50 MG/G
OINTMENT TOPICAL ONCE
Status: CANCELLED | OUTPATIENT
Start: 2022-09-30 | End: 2022-09-30

## 2022-09-30 RX ORDER — LIDOCAINE HYDROCHLORIDE 40 MG/ML
SOLUTION TOPICAL ONCE
Status: CANCELLED | OUTPATIENT
Start: 2022-09-30 | End: 2022-09-30

## 2022-09-30 RX ORDER — BACITRACIN ZINC AND POLYMYXIN B SULFATE 500; 1000 [USP'U]/G; [USP'U]/G
OINTMENT TOPICAL ONCE
Status: CANCELLED | OUTPATIENT
Start: 2022-09-30 | End: 2022-09-30

## 2022-09-30 ASSESSMENT — PAIN DESCRIPTION - FREQUENCY: FREQUENCY: INTERMITTENT

## 2022-09-30 ASSESSMENT — PAIN DESCRIPTION - LOCATION: LOCATION: FOOT

## 2022-09-30 ASSESSMENT — PAIN SCALES - GENERAL: PAINLEVEL_OUTOF10: 4

## 2022-09-30 ASSESSMENT — PAIN DESCRIPTION - PAIN TYPE: TYPE: ACUTE PAIN

## 2022-09-30 ASSESSMENT — PAIN - FUNCTIONAL ASSESSMENT: PAIN_FUNCTIONAL_ASSESSMENT: PREVENTS OR INTERFERES SOME ACTIVE ACTIVITIES AND ADLS

## 2022-09-30 ASSESSMENT — PAIN DESCRIPTION - ORIENTATION: ORIENTATION: RIGHT

## 2022-09-30 ASSESSMENT — PAIN DESCRIPTION - DESCRIPTORS: DESCRIPTORS: TENDER;SORE;ACHING

## 2022-09-30 NOTE — PLAN OF CARE
Patient did well this past week and will continue with current wound care regime. Patient will begin wearing her immobilizing boot to trial and MD wants her to stop  using if it causes more pain. Follow up in wound clinic next week. Discharge instructions reviewed with patient, all questions answered, copy given to patient. Dressings were applied to all wounds per M.D. Instructions at this visit.

## 2022-09-30 NOTE — H&P
88 [facility] H&P Note    Alvaro Chris     : 1950    DATE OF VISIT:  2022    Subjective:     Alvaro Chris is a 67 y.o. female who has a dehisced surgical ulcer located on the right, posterior (Achilles area of the) ankle. Current complaint of pain in this ulcer? yes. Quality of pain: aching  Timing: intermittent and stable  Severity: mild  Associated Signs/Symptoms:  swelling and drainage (light, clear)  Other significant symptoms or pertinent ulcer history: Ms. Yeimi Blood had a Hx of Achilles tendonosis dating back at least a few years, and suffered a right Achilles tendon tear a little over a year ago, and underwent repair in July of last year. She had a small dehiscence of the surgical wound that was never complicated by deep infection, never required repeat surgery, but has still not healed. She tells me that she saw a couple of providers for wound care, but none since January of this year. The treatment plan at that time was a vinegar soak for antisepsis, a silicone contact layer and secondary foam, but she wasn't really seeing major results, so just started trying to take care of it on her own. A number of months have gone by however, and while the wound isn't very symptomatic for her, it still is open, drains a bit. Current local care is typically neosporin, and she wears a very low shoe, which doesn't put pressure or friction on the open wound. No F/C/D, mild focal swelling right at the tendon area itself, no pus or wound malodor, no strong pain. No history of significant trouble healing her (multiple) other orthopedic surgery wounds in the past.         Additional ulcer(s) noted? no.      Ms. Yeimi Blood has a past medical history of Achilles tendon tear, right, COVID-19, and Recurrent oral ulcers. She has a past surgical history that includes Colonoscopy (2013); Refractive surgery (Bilateral, 2016); Carpal tunnel release (Bilateral);  Rotator cuff repair (Bilateral); back surgery; Foot surgery (Bilateral); Hysterectomy; Knee arthroscopy (Right, 12/20/2016); shoulder surgery (Right); Achilles tendon surgery (Right, 07/13/2021); and Finger trigger release (Right, 06/27/2022). Her family history includes Cancer in her father; Diabetes in her mother. Ms. Sourav Velasco reports that she has never smoked. She has never used smokeless tobacco. She reports that she does not drink alcohol and does not use drugs. Her current medication list consists of Biotin, Vitamin D3, amitriptyline, aspirin, atorvastatin, fenofibrate, glimepiride, hydroCHLOROthiazide, irbesartan, metFORMIN (MOD), omeprazole, pioglitazone, polyethylene glycol, valsartan, and vitamin B-12. Allergies: Adhesive tape    Pertinent items from the review of systems are discussed in the HPI; the remainder of the ROS was reviewed and is negative. Objective:     Vitals:    09/23/22 0954   BP: (!) 151/73   Pulse: (!) 101   Resp: 18   Temp: 97.8 °F (36.6 °C)   TempSrc: Oral   Weight: 173 lb 6.4 oz (78.7 kg)   Height: 5' 3\" (1.6 m)     NATALYA today -- right 1.00, left 4.45 (arm systolics 581 x 2, right ankle 124 & 138, left ankle 136 & 152). Constitutional:  well-developed, well-nourished, NAD  Psychiatric:  oriented to person, place and time; mood and affect appropriate for the situation   Eyes:  pupils equal, round and reactive to light; sclerae anicteric, conjunctivae not pale  Cardiovascular:  bilateral pedal pulses palpable, feet warm, good cap refill; no lower extremity pitting edema  Lymphatic:  no inguinal or popliteal adenopathy, no angitis or cellulitis  Musculoskeletal:  no clubbing, cyanosis or petechiae; RLE and LLE with no gross effusions, joint misalignment or acute arthritis  Ivanna-ulcer skin: indurated, pink, warm, and hyperkeratotic.  A bit of swelling proximal and distal, along the Achilles tendon itself, but it's not inflamed or fluctuant  Ulcer(s):  small oval wound, a couple of mm deep, mostly fibronecrotic down into SQ tissue, some fibrin, presumed biofilm, minor epidermal edge necrosis, serous exudate, no pus, no exposure of the tendon itself . Photos also saved in electronic chart. Today's Wound Measurements, per RN documentation:  Wound 09/23/22 #1, Right Achilles, Non-healing Surgical Wound, Full Thickness, Onset 7/2021-Wound Length (cm): 1 cm    Wound 09/23/22 #1, Right Achilles, Non-healing Surgical Wound, Full Thickness, Onset 7/2021-Wound Width (cm): 0.6 cm    Wound 09/23/22 #1, Right Achilles, Non-healing Surgical Wound, Full Thickness, Onset 7/2021-Wound Depth (cm): 0.1 cm  _____________________________    June Hgb A1c was 5.8%. No recent pertinent imaging.      Assessment:     Patient Active Problem List   Diagnosis Code    Carpal tunnel syndrome, right G56.01    DM type 2 (diabetes mellitus, type 2) ([facility] [address] E11.9    Essential hypertension I10    Gastroesophageal reflux disease K21.9    Hemochromatosis carrier Z14.8    Insomnia with sleep apnea G47.00, G47.30    Localized osteoarthrosis M19.90    Other and unspecified hyperlipidemia E78.5    Class 1 obesity due to excess calories with serious comorbidity and body mass index (BMI) of 30.0 to 30.9 in adult E66.09, Z68.30    Obstructive sleep apnea on CPAP G47.33, Z99.89    Spinal stenosis of lumbar region without neurogenic claudication M48.061    Spondylolisthesis M43.10    Trigger middle finger of right hand M65.331    Surgical wound dehiscence, initial encounter T81.31XA       Assessment of today's active condition(s): underlying DM and mild obesity, Hx of Achilles tendon tear, repaired last July, small area of surgical dehiscence, mostly with just some fibronecrotic tissue on exam; no retained suture, no tendon exposure, no signs of infection or ischemia; given how fibrotic it is, the main focus will be on debridement and moist wound care; there isn't really a direct offloading concern, but when she walks, I wonder if ankle ROM could be flexing that wound bed back and forth a bit too much, which can sometimes make it hard for similar wounds  to heal. Factors contributing to occurrence and/or persistence of the chronic ulcer include diabetes and shear force. Medical necessity of today's visit is shown by the above documentation. Sharp debridement is indicated today, based upon the exam findings in the ulcer(s) above. Procedure note:     Consent obtained. Time out performed per [facility]. Anesthetic  Anesthetic: 4% Lidocaine Cream     Using a curette and #15 blade scalpel, I sharply debrided the right, posterior (Achilles area of the) ankle ulcer(s) down through and including the removal of subcutaneous tissue. The type(s) of tissue debrided included fibrin, biofilm, and necrotic/eschar. Total Surface Area Debrided: 1 sq cm. Wound bed did look reasonably pink-red after that, which is encouraging; no signs of deeper infection. The ulcers were then irrigated with normal saline solution. The procedure was completed with a small amount of bleeding, and hemostasis was with pressure. The patient tolerated the procedure well, with no significant complications. The patient's level of pain during and after the procedure was monitored. Post-debridement measurements, if different from pre-debridement, are in the flowsheet as well. Discharge plan:     Treatment in the wound care center today, per RN documentation: Wound 09/23/22 #1, Right Achilles, Non-healing Surgical Wound, Full Thickness, Onset 7/2021-Dressing/Treatment: Other (comment) (Triad and PSO mix, 4x4's, ACE to hold dressing in place. ). She has a lot of trouble with dressing adhesives, in terms of her skin, so the Ace to hold the dressing in place, and keep edema down, is fine.      I'd like her to bring her prior immobilizing boot with her next week, and if it looks like immobilization could help things to settle down and granulate more, I might have her get back into the boot at that time. No Abx needed. Keep up the good work with glucose control; make sure to get a good amount of dietary protein each day. Home treatment: good handwashing before and after any dressing changes. Cleanse ulcer with saline or soap & water before dressing change. May use Vaseline (petrolatum), Aquaphor, Aveeno, CeraVe, Cetaphil, Eucerin, Lubriderm, etc for dry skin. Dressing type for home: as above, once daily. Written discharge instructions given to patient. Follow up in 1 week.     Electronically signed by Trevin Shetty MD on 9/30/2022 at 9:12 AM.

## 2022-10-03 NOTE — DISCHARGE INSTRUCTIONS
[address] [facility] Physician Orders and Discharge [address], [address]  [place], [facility]  Telephone: [phone]      Fax: [phone]        Your home care company:   N/a     Your wound-care supplies will be provided by:  Patient     NAME:  Brady Estrada   YOB: 1950  PRIMARY DIAGNOSIS FOR WOUND CARE CENTER:  Non-healing surgical wound     Wound cleansing:   Do not scrub or use excessive force. Wash hands with soap and water before and after dressing changes. Prior to applying a clean dressing, cleanse wound with normal saline, wound cleanser, or mild soap and water. Ask your physician or nurse before getting the wound(s) wet in the shower. Wound care for home:     Right Achilles Wound:  Vashe to wound after cleansing wound spray 2-3 sprays into wound let sit about 60 seconds, do not rinse then apply your dressing   Mix a small dot of our TRIAD we gave you last week with your antibiotic ointment to your wound  Cover with a dry dressing (no adhesives)  ACE wrap to help keep dressing in place  Change dressing every day    KEEP UP WITH WEARING YOUR BOOT AGAIN THIS WEEK     Please note, all wounds (unless stated otherwise here) were mechanically debrided at the time of cleansing here in the wound-care center today, so a small amount of pain, drainage or bleeding from that process might be expected, and is normal.      All products for home use, including multiple products for a single wound if applicable, are medically necessary in order to achieve the best chance at timely wound healing. See provider documentation for details if needed. Substituted dressings applied in the [address] [place] [address],[address] today, if applicable:     N/a     New orders for this week (labs, imaging, medications, etc.):     DR. SEAY WILL CHECK WITH YOUR INSRUANCE ON APPYING A JOAQUÍN NEGATIVE PRESSURE DRESSING        Additional instructions for specific diagnoses: N/a           F/U Appointment is with Dr. Mosley Friday in 1 week, on                                   at                       .     Your nurse  is Elías Craft RN      If we applied slip-resistant hospital socks today, be sure to remove them at least once a day to inspect your toes or feet, even if you're not changing the wraps or dressings underneath. If you see anything concerning (redness, excess moisture, etc), please call and let us know right away. Should you experience any significant changes in your wound(s) (including redness, increased warmth, increased pain, increased drainage, odor, or fever) or have questions about your wound care, please contact the [facility] at [phone] Monday-Thursday from 8:00 am - 4:30 pm, or Friday from 8:00 am - 2:30 pm.  If you need help with your wound outside these hours and cannot wait until we are again available, contact your home-care company (if applicable), your PCP, or go to the nearest emergency room.

## 2022-10-07 ENCOUNTER — HOSPITAL ENCOUNTER (OUTPATIENT)
Dept: WOUND CARE | Age: 72
Discharge: HOME | End: 2022-10-07
Payer: MEDICARE

## 2022-10-07 VITALS
HEIGHT: 63 IN | DIASTOLIC BLOOD PRESSURE: 69 MMHG | WEIGHT: 174.2 LBS | SYSTOLIC BLOOD PRESSURE: 119 MMHG | TEMPERATURE: 97.6 F | RESPIRATION RATE: 18 BRPM | BODY MASS INDEX: 30.87 KG/M2 | HEART RATE: 102 BPM

## 2022-10-07 DIAGNOSIS — T81.31XA SURGICAL WOUND DEHISCENCE, INITIAL ENCOUNTER: Primary | ICD-10-CM

## 2022-10-07 PROCEDURE — 97597 DBRDMT OPN WND 1ST 20 CM/<: CPT

## 2022-10-07 PROCEDURE — 97597 DBRDMT OPN WND 1ST 20 CM/<: CPT | Performed by: INTERNAL MEDICINE

## 2022-10-07 RX ORDER — LIDOCAINE 40 MG/G
CREAM TOPICAL ONCE
Status: CANCELLED | OUTPATIENT
Start: 2022-10-07 | End: 2022-10-07

## 2022-10-07 RX ORDER — LIDOCAINE 40 MG/G
CREAM TOPICAL ONCE
Status: DISCONTINUED | OUTPATIENT
Start: 2022-10-07 | End: 2022-10-08 | Stop reason: HOSPADM

## 2022-10-07 RX ORDER — LIDOCAINE HYDROCHLORIDE 40 MG/ML
SOLUTION TOPICAL ONCE
Status: CANCELLED | OUTPATIENT
Start: 2022-10-07 | End: 2022-10-07

## 2022-10-07 RX ORDER — LIDOCAINE 50 MG/G
OINTMENT TOPICAL ONCE
Status: CANCELLED | OUTPATIENT
Start: 2022-10-07 | End: 2022-10-07

## 2022-10-07 RX ORDER — BACITRACIN ZINC AND POLYMYXIN B SULFATE 500; 1000 [USP'U]/G; [USP'U]/G
OINTMENT TOPICAL ONCE
Status: CANCELLED | OUTPATIENT
Start: 2022-10-07 | End: 2022-10-07

## 2022-10-07 NOTE — PLAN OF CARE
Wound debrided, some improvement noted in size today. MD is doing to check with patients insurance on using a JOAQUÍN dressing. Patient is tolerating immobilizer boot and states her pain is less with it. No changes in overall wound care regime. Discharge instructions reviewed with patient, all questions answered, copy given to patient. Dressings were applied to all wounds per M.D. Instructions at this visit.

## 2022-10-10 NOTE — DISCHARGE INSTRUCTIONS
215 [facility] Physician Orders and Discharge 800 [facility]  [address], [address]  [place], [facility]  Telephone: [phone]      Fax: [phone]        Your home care company:   N/a     Your wound-care supplies will be provided by:  Patient     NAME:  Dorcas Barr   YOB: 1950  PRIMARY DIAGNOSIS FOR WOUND CARE CENTER:  Non-healing surgical wound     Wound cleansing:   Do not scrub or use excessive force. Wash hands with soap and water before and after dressing changes. Prior to applying a clean dressing, cleanse wound with normal saline, wound cleanser, or mild soap and water. Ask your physician or nurse before getting the wound(s) wet in the shower. Wound care for home:     Right Achilles Wound:  Vashe to wound after cleansing wound spray 2-3 sprays into wound let sit about 60 seconds, do not rinse then apply your dressing   Purachol and hydrogel to wound  Use A LOT of skin prep and NO Adhesive strips, apply a JOAQUÍN Negative Pressure dressing (we will send a spare dressing home with you just in case you need to change it  Medium compression Medi   Keep dressing clean, dry and in place for the week      KEEP UP WITH WEARING YOUR BOOT AGAIN THIS WEEK     Please note, all wounds (unless stated otherwise here) were mechanically debrided at the time of cleansing here in the wound-care center today, so a small amount of pain, drainage or bleeding from that process might be expected, and is normal.      All products for home use, including multiple products for a single wound if applicable, are medically necessary in order to achieve the best chance at timely wound healing. See provider documentation for details if needed.      Substituted dressings applied in the [address] [place] [address],[address] today, if applicable:     N/a     New orders for this week (labs, imaging, medications, etc.):          Additional instructions for specific diagnoses:     N/a F/U Appointment is with Dr. Kerrie Hall in 1 week, on                                   at                       .     Your nurse  is Yokasta Neri RN      If we applied slip-resistant hospital socks today, be sure to remove them at least once a day to inspect your toes or feet, even if you're not changing the wraps or dressings underneath. If you see anything concerning (redness, excess moisture, etc), please call and let us know right away. Should you experience any significant changes in your wound(s) (including redness, increased warmth, increased pain, increased drainage, odor, or fever) or have questions about your wound care, please contact the [facility] at [phone] Monday-Thursday from 8:00 am - 4:30 pm, or Friday from 8:00 am - 2:30 pm.  If you need help with your wound outside these hours and cannot wait until we are again available, contact your home-care company (if applicable), your PCP, or go to the nearest emergency room.

## 2022-10-13 NOTE — PROGRESS NOTES
88 [facility] Progress Note    Armaan Rios     : 1950    DATE OF VISIT:  2022    Subjective:     Armaan Rios is a 67 y.o. female who has a dehisced surgical ulcer located on the right, posterior (Achilles area of the) ankle. Significant symptoms or pertinent wound history since last visit: feeling ok overall, no fever, not much drainage, doing fine with current dressings; brought her (low) immobilizer boot with her, and from the slight wound-edge motion that I see with ankle ROM, I think wearing that COULD help things to quiet down a bit more, and heal.     Additional ulcer(s) noted? no.      Her current medication list consists of Biotin, Vitamin D3, amitriptyline, aspirin, atorvastatin, fenofibrate, glimepiride, hydroCHLOROthiazide, irbesartan, metFORMIN (MOD), omeprazole, pioglitazone, polyethylene glycol, and vitamin B-12. Allergies: Adhesive tape    Objective:     Vitals:    22 0941   BP: (!) 148/81   Pulse: (!) 101   Resp: 18   Temp: 98 °F (36.7 °C)   TempSrc: Oral   Weight: 171 lb 9.6 oz (77.8 kg)   Height: 5' 3\" (1.6 m)     Constitutional:  well-developed, well-nourished, NAD  Psychiatric:  oriented to person, place and time; mood and affect appropriate for the situation   Cardiovascular:  bilateral pedal pulses palpable, feet warm, good cap refill; no lower extremity pitting edema  Lymphatic:  no inguinal or popliteal adenopathy, no angitis or cellulitis  Musculoskeletal:  no clubbing, cyanosis or petechiae; RLE and LLE with no gross effusions, joint misalignment or acute arthritis  Ivanna-ulcer skin: indurated, pink, warm, and still mildly hyperkeratotic.  A bit of swelling proximal and distal, along the Achilles tendon itself, but it's not inflamed or fluctuant  Ulcer(s):  small oval wound, a couple of mm deep, mostly fibronecrotic down into SQ tissue, some fibrin, presumed biofilm, no new epidermal edge necrosis, serous exudate, no pus, no exposure of the tendon itself, maybe just a bit  and more lively than last week. Photos also saved in electronic chart. Today's wound measurements, per RN documentation:  Wound 09/23/22 #1, Right Achilles, Non-healing Surgical Wound, Full Thickness, Onset 7/2021-Wound Length (cm): 1.2 cm    Wound 09/23/22 #1, Right Achilles, Non-healing Surgical Wound, Full Thickness, Onset 7/2021-Wound Width (cm): 0.5 cm    Wound 09/23/22 #1, Right Achilles, Non-healing Surgical Wound, Full Thickness, Onset 7/2021-Wound Depth (cm): 0.2 cm    Assessment:     Patient Active Problem List   Diagnosis Code    Carpal tunnel syndrome, right G56.01    DM type 2 (diabetes mellitus, type 2) ([facility]) E11.9    Essential hypertension I10    Gastroesophageal reflux disease K21.9    Hemochromatosis carrier Z14.8    Insomnia with sleep apnea G47.00, G47.30    Localized osteoarthrosis M19.90    Other and unspecified hyperlipidemia E78.5    Class 1 obesity due to excess calories with serious comorbidity and body mass index (BMI) of 30.0 to 30.9 in adult E66.09, Z68.30    Obstructive sleep apnea on CPAP G47.33, Z99.89    Spinal stenosis of lumbar region without neurogenic claudication M48.061    Spondylolisthesis M43.10    Trigger middle finger of right hand M65.331    Surgical wound dehiscence, initial encounter T81.31XA       Assessment of today's active condition(s): Hx Achilles tendon rupture, repair, small but prolonged surgical dehiscence; no signs of ischemia, infection, severe swelling, foreign body, I think probably nonhealing from some residual necrotic tissue, wound-age-related fibrosis, maybe the ongoing soft tissue motion of ambulation. Factors contributing to occurrence and/or persistence of the chronic ulcer include diabetes and shear force. Medical necessity of today's visit is shown by the above documentation. Sharp debridement is indicated today, based upon the exam findings in the wound(s) above.      Procedure note: Consent obtained. Time out performed per [facility] policy. Anesthetic  Anesthetic: 4% Lidocaine Cream     Using a curette and #15 blade scalpel, I sharply debrided the right, posterior (Achilles area of the) ankle ulcer(s) down through and including the removal of dermis. The type(s) of tissue debrided included fibrin, biofilm, and necrotic/eschar. Total Surface Area Debrided: 1 sq cm. The ulcers were then irrigated with normal saline solution. The procedure was completed with a small amount of bleeding, and hemostasis was with pressure. The patient tolerated the procedure well, with no significant complications. The patient's level of pain during and after the procedure was monitored. Post-debridement measurements, if different from pre-debridement, are in the flowsheet as well. Discharge plan:     Treatment in the wound care center today, per RN documentation: Wound 09/23/22 #1, Right Achilles, Non-healing Surgical Wound, Full Thickness, Onset 7/2021-Dressing/Treatment: Other (comment) (Triad/PSO, 4x4's, tasia). Would try to get back into that immobilizing boot, as long as it doesn't cause any serious discomfort. If it inadvertently seems to be putting pressure on the wound, I can add a couple of offloading felt bolsters next week. Home treatment: good handwashing before and after any dressing changes. Cleanse wound with saline or soap & water before dressing change. May use Vaseline (petrolatum), Aquaphor, Aveeno, CeraVe, Cetaphil, Eucerin, Lubriderm, etc for dry skin. Dressing type for home: as above, once daily. Written discharge instructions given to patient. Follow up in 1 week.     Electronically signed by Abdiaziz Monroy MD on 10/13/2022 at 8:17 AM.

## 2022-10-13 NOTE — PROGRESS NOTES
[facility] Progress Note    Ben Jeans     : 1950    DATE OF VISIT:  10/7/2022    Subjective:     Ben Jeans is a 67 y.o. female who has a dehisced surgical ulcer located on the right, posterior (Achilles area of the) ankle. Significant symptoms or pertinent wound history since last visit: feeling ok overall, no fever, doing fine with dressings. Started to wear her low immobilizer boot, found it awkward, changed to a taller boot she had, which is a bit cumbersome, but does make the ankle feel a bit better to her (she had a bit more wound sensitivity for a time, I think from more viable / sensate tissue being exposed in the wound now). Additional ulcer(s) noted? no.      Her current medication list consists of Biotin, Vitamin D3, amitriptyline, aspirin, atorvastatin, fenofibrate, glimepiride, hydroCHLOROthiazide, irbesartan, metFORMIN (MOD), omeprazole, pioglitazone, polyethylene glycol, and vitamin B-12. Allergies: Adhesive tape    Objective:     Vitals:    10/07/22 0856 10/07/22 0900   BP: 119/69 119/69   Pulse: (!) 102 (!) 102   Resp: 18    Temp: 97.6 °F (36.4 °C)    TempSrc: Oral    Weight: 174 lb 3.2 oz (79 kg)    Height: 5' 3\" (1.6 m)      Constitutional:  well-developed, well-nourished, NAD  Psychiatric:  oriented to person, place and time; mood and affect appropriate for the situation   Cardiovascular:  bilateral pedal pulses palpable, feet warm, good cap refill; no lower extremity pitting edema  Lymphatic:  no inguinal or popliteal adenopathy, no angitis or cellulitis  Musculoskeletal:  no clubbing, cyanosis or petechiae; RLE and LLE with no gross effusions, joint misalignment or acute arthritis  Ivanna-ulcer skin: indurated, pink, warm, and less hyperkeratotic.  Less swelling along the tendon itself than 2 weeks ago, I think  Ulcer(s):  small oval wound, a couple of mm deep, probably a bit more granular and less fibronecrotic, no signs of infection, some fibrin, presumed biofilm, minor epidermal necrosis; getting healthier, not quite getting smaller yet. Photos also saved in electronic chart. Today's wound measurements, per RN documentation:  Wound 09/23/22 #1, Right Achilles, Non-healing Surgical Wound, Full Thickness, Onset 7/2021-Wound Length (cm): 0.6 cm    Wound 09/23/22 #1, Right Achilles, Non-healing Surgical Wound, Full Thickness, Onset 7/2021-Wound Width (cm): 0.4 cm    Wound 09/23/22 #1, Right Achilles, Non-healing Surgical Wound, Full Thickness, Onset 7/2021-Wound Depth (cm): 0.2 cm    Assessment:     Patient Active Problem List   Diagnosis Code    Carpal tunnel syndrome, right G56.01    DM type 2 (diabetes mellitus, type 2) ([facility]) E11.9    Essential hypertension I10    Gastroesophageal reflux disease K21.9    Hemochromatosis carrier Z14.8    Insomnia with sleep apnea G47.00, G47.30    Localized osteoarthrosis M19.90    Other and unspecified hyperlipidemia E78.5    Class 1 obesity due to excess calories with serious comorbidity and body mass index (BMI) of 30.0 to 30.9 in adult E66.09, Z68.30    Obstructive sleep apnea on CPAP G47.33, Z99.89    Spinal stenosis of lumbar region without neurogenic claudication M48.061    Spondylolisthesis M43.10    Trigger middle finger of right hand M65.331    Surgical wound dehiscence, initial encounter T81.31XA       Assessment of today's active condition(s): Hx Achilles rupture, repair, small but protracted wound dehiscence, no signs of infection or ischemia; probably being helped a bit by immobilization, but now that it's a bit more lively and granular, we need to work on getting it smaller. Factors contributing to occurrence and/or persistence of the chronic ulcer include diabetes and shear force. Medical necessity of today's visit is shown by the above documentation. Sharp debridement is indicated today, based upon the exam findings in the wound(s) above. Procedure note:     Consent obtained.  Time out performed per [facility] policy. Anesthetic  Anesthetic: 4% Lidocaine Cream     Using a curette, I sharply debrided the right, posterior (Achilles area of the) ankle ulcer(s) down through and including the removal of dermis. The type(s) of tissue debrided included fibrin, biofilm, and necrotic/eschar. Total Surface Area Debrided: 1 sq cm. The ulcers were then irrigated with normal saline solution. The procedure was completed with a small amount of bleeding, and hemostasis was with pressure. The patient tolerated the procedure well, with no significant complications. The patient's level of pain during and after the procedure was monitored. Post-debridement measurements, if different from pre-debridement, are in the flowsheet as well. Discharge plan:     Treatment in the wound care center today, per RN documentation: Wound 09/23/22 #1, Right Achilles, Non-healing Surgical Wound, Full Thickness, Onset 7/2021-Dressing/Treatment: Other (comment) (Triad/PSO, 4x4's, tasia). Continue taller immobilizer boot for now. Will check on coverage of JOAQUÍN NPWT -- if covered, might place that next week; if not, will probably change local care to collagen or Multidex, e.g.     Home treatment: good handwashing before and after any dressing changes. Cleanse wound with saline or soap & water before dressing change. May use Vaseline (petrolatum), Aquaphor, Aveeno, CeraVe, Cetaphil, Eucerin, Lubriderm, etc for dry skin. Dressing type for home: as above, once daily. Written discharge instructions given to patient. Follow up in 1 week.     Electronically signed by Matthew Cruz MD on 10/13/2022 at 8:24 AM.

## 2022-10-14 ENCOUNTER — HOSPITAL ENCOUNTER (OUTPATIENT)
Dept: WOUND CARE | Age: 72
Discharge: HOME | End: 2022-10-14
Payer: MEDICARE

## 2022-10-14 VITALS
HEIGHT: 63 IN | DIASTOLIC BLOOD PRESSURE: 75 MMHG | RESPIRATION RATE: 20 BRPM | BODY MASS INDEX: 30.9 KG/M2 | WEIGHT: 174.38 LBS | HEART RATE: 101 BPM | SYSTOLIC BLOOD PRESSURE: 150 MMHG | TEMPERATURE: 98.5 F

## 2022-10-14 DIAGNOSIS — T81.31XA SURGICAL WOUND DEHISCENCE, INITIAL ENCOUNTER: Primary | ICD-10-CM

## 2022-10-14 PROCEDURE — 97597 DBRDMT OPN WND 1ST 20 CM/<: CPT

## 2022-10-14 PROCEDURE — 97607 NEG PRS WND THR NDME<=50SQCM: CPT

## 2022-10-14 PROCEDURE — 97597 DBRDMT OPN WND 1ST 20 CM/<: CPT | Performed by: INTERNAL MEDICINE

## 2022-10-14 RX ORDER — LIDOCAINE 40 MG/G
CREAM TOPICAL ONCE
Status: CANCELLED | OUTPATIENT
Start: 2022-10-14 | End: 2022-10-14

## 2022-10-14 RX ORDER — LIDOCAINE 40 MG/G
CREAM TOPICAL ONCE
Status: DISCONTINUED | OUTPATIENT
Start: 2022-10-14 | End: 2022-10-15 | Stop reason: HOSPADM

## 2022-10-14 RX ORDER — LIDOCAINE 50 MG/G
OINTMENT TOPICAL ONCE
Status: CANCELLED | OUTPATIENT
Start: 2022-10-14 | End: 2022-10-14

## 2022-10-14 RX ORDER — LIDOCAINE HYDROCHLORIDE 40 MG/ML
SOLUTION TOPICAL ONCE
Status: CANCELLED | OUTPATIENT
Start: 2022-10-14 | End: 2022-10-14

## 2022-10-14 RX ORDER — BACITRACIN ZINC AND POLYMYXIN B SULFATE 500; 1000 [USP'U]/G; [USP'U]/G
OINTMENT TOPICAL ONCE
Status: CANCELLED | OUTPATIENT
Start: 2022-10-14 | End: 2022-10-14

## 2022-10-14 NOTE — PLAN OF CARE
Will begin using JOAQUÍN NPWT dressing this week along with collagen for a primary dressing. We will not use the adhesive strips that come with JOAQUÍN. We are going to put a small strip of the adhesive on her calf to see how she does with the adhesive on the strips. Patient wound debrided and patient tolerated well. Discharge instructions reviewed with patient, all questions answered, copy given to patient. Dressings were applied to all wounds per M.D. Instructions at this visit.

## 2022-10-14 NOTE — FLOWSHEET NOTE
No adhesive strips to JOAQUÍN dressing- applied test adhesive strip to Below Right knee as ordered to see if pt tolerates

## 2022-10-19 NOTE — DISCHARGE INSTRUCTIONS
[facility] Physician Orders and Discharge [address] [facility]  [address], [address]  [place], [facility]  Telephone: [phone]      Fax: [phone]        Your home care company:   N/a     Your wound-care supplies will be provided by:  Patient     NAME:  Shannan Cohen   YOB: 1950  PRIMARY DIAGNOSIS FOR WOUND CARE CENTER:  Non-healing surgical wound     Wound cleansing:   Do not scrub or use excessive force. Wash hands with soap and water before and after dressing changes. Prior to applying a clean dressing, cleanse wound with normal saline, wound cleanser, or mild soap and water. Ask your physician or nurse before getting the wound(s) wet in the shower. Wound care for home:     Right Achilles Wound:  Vashe to wound after cleansing wound spray 2-3 sprays into wound let sit about 60 seconds, do not rinse then apply your dressing   Purachol and hydrogel to wound  Use A LOT of skin prep and NO Adhesive strips, apply a JOAQUÍN Negative Pressure dressing (we will send a spare dressing home with you just in case you need to change it  Medium compression Medi   Keep dressing clean, dry and in place for the week      KEEP UP WITH WEARING YOUR BOOT AGAIN THIS WEEK     Please note, all wounds (unless stated otherwise here) were mechanically debrided at the time of cleansing here in the wound-care center today, so a small amount of pain, drainage or bleeding from that process might be expected, and is normal.      All products for home use, including multiple products for a single wound if applicable, are medically necessary in order to achieve the best chance at timely wound healing. See provider documentation for details if needed. Substituted dressings applied in the [address] [place] [address],[address] today, if applicable:     N/a     New orders for this week (labs, imaging, medications, etc.):     Continue to wear boot if walking far distances.  Ok to walk without boot at home       Additional instructions for specific diagnoses:     N/a           F/U Appointment is with Dr. Quintin Farrar in 1 week, on                                   at                       .     Your nurse  is Linnette Sims RN      If we applied slip-resistant hospital socks today, be sure to remove them at least once a day to inspect your toes or feet, even if you're not changing the wraps or dressings underneath. If you see anything concerning (redness, excess moisture, etc), please call and let us know right away. Should you experience any significant changes in your wound(s) (including redness, increased warmth, increased pain, increased drainage, odor, or fever) or have questions about your wound care, please contact the [facility] at [phone] Monday-Thursday from 8:00 am - 4:30 pm, or Friday from 8:00 am - 2:30 pm.  If you need help with your wound outside these hours and cannot wait until we are again available, contact your home-care company (if applicable), your PCP, or go to the nearest emergency room.

## 2022-10-21 ENCOUNTER — HOSPITAL ENCOUNTER (OUTPATIENT)
Dept: WOUND CARE | Age: 72
Discharge: HOME | End: 2022-10-21
Payer: MEDICARE

## 2022-10-21 VITALS
WEIGHT: 173.13 LBS | DIASTOLIC BLOOD PRESSURE: 72 MMHG | BODY MASS INDEX: 30.68 KG/M2 | SYSTOLIC BLOOD PRESSURE: 131 MMHG | RESPIRATION RATE: 20 BRPM | HEART RATE: 105 BPM | HEIGHT: 63 IN | TEMPERATURE: 97.2 F

## 2022-10-21 DIAGNOSIS — T81.31XA SURGICAL WOUND DEHISCENCE, INITIAL ENCOUNTER: Primary | ICD-10-CM

## 2022-10-21 PROCEDURE — 97607 NEG PRS WND THR NDME<=50SQCM: CPT

## 2022-10-21 PROCEDURE — 11045 DBRDMT SUBQ TISS EACH ADDL: CPT

## 2022-10-21 PROCEDURE — 11042 DBRDMT SUBQ TIS 1ST 20SQCM/<: CPT

## 2022-10-21 RX ORDER — LIDOCAINE 50 MG/G
OINTMENT TOPICAL ONCE
Status: CANCELLED | OUTPATIENT
Start: 2022-10-21 | End: 2022-10-21

## 2022-10-21 RX ORDER — GABAPENTIN 100 MG/1
100 CAPSULE ORAL 3 TIMES DAILY
COMMUNITY

## 2022-10-21 RX ORDER — LIDOCAINE 40 MG/G
CREAM TOPICAL ONCE
Status: CANCELLED | OUTPATIENT
Start: 2022-10-21 | End: 2022-10-21

## 2022-10-21 RX ORDER — BACITRACIN ZINC AND POLYMYXIN B SULFATE 500; 1000 [USP'U]/G; [USP'U]/G
OINTMENT TOPICAL ONCE
Status: CANCELLED | OUTPATIENT
Start: 2022-10-21 | End: 2022-10-21

## 2022-10-21 RX ORDER — LIDOCAINE 40 MG/G
CREAM TOPICAL ONCE
Status: DISCONTINUED | OUTPATIENT
Start: 2022-10-21 | End: 2022-10-22 | Stop reason: HOSPADM

## 2022-10-21 RX ORDER — LIDOCAINE HYDROCHLORIDE 40 MG/ML
SOLUTION TOPICAL ONCE
Status: CANCELLED | OUTPATIENT
Start: 2022-10-21 | End: 2022-10-21

## 2022-10-21 NOTE — PLAN OF CARE
Patient seen in [facility] today. States JOAQUÍN worked well this week. Denies pain. Wound showing signs of improvement. Debridement completed today per Dr. Jonel Lozano. Patient inquired about wearing boot only when walking long distances. Dr. Jonel Lozano ok with this. Current plan of care continued. F/u in [facility] in 1 week as ordered, pt. Aware to call sooner with any changes or questions/concerns. Discharge instructions reviewed with patient, all questions answered, copy given to patient. Dressings were applied to all wounds per M.D. Instructions at this visit.

## 2022-10-24 NOTE — DISCHARGE INSTRUCTIONS
215 [facility] Physician Orders and Discharge 800 [facility]  [address], [address]  [place], [facility]  Telephone: [phone]      Fax: [phone]        Your home care company:   N/a     Your wound-care supplies will be provided by:  Patient     NAME:  Yue Ontiveros   YOB: 1950  PRIMARY DIAGNOSIS FOR WOUND CARE CENTER:  Non-healing surgical wound     Wound cleansing:   Do not scrub or use excessive force. Wash hands with soap and water before and after dressing changes. Prior to applying a clean dressing, cleanse wound with normal saline, wound cleanser, or mild soap and water. Ask your physician or nurse before getting the wound(s) wet in the shower. Wound care for home:     Right Achilles Wound:  Vashe to wound after cleansing wound spray 2-3 sprays into wound let sit about 60 seconds, do not rinse then apply your dressing   Purachol and multidex to wound  Use A LOT of skin prep and NO Adhesive strips, apply a JOAQUÍN Negative Pressure dressing (we will send a spare dressing home with you just in case you need to change it  Medium compression Medi   Keep dressing clean, dry and in place for the week      KEEP UP WITH WEARING YOUR BOOT AGAIN THIS WEEK     Please note, all wounds (unless stated otherwise here) were mechanically debrided at the time of cleansing here in the wound-care center today, so a small amount of pain, drainage or bleeding from that process might be expected, and is normal.      All products for home use, including multiple products for a single wound if applicable, are medically necessary in order to achieve the best chance at timely wound healing. See provider documentation for details if needed.      Substituted dressings applied in the [address] [place] [address],[address] today, if applicable:     N/a     New orders for this week (labs, imaging, medications, etc.):  Ok to stop wearing boot as long as you are not having pain Additional instructions for specific diagnoses:     N/a           F/U Appointment is with Dr. Scarlett Lindsey in 1 week, on                                   at                       .     Your nurse  is Ramirez Bravo RN      If we applied slip-resistant hospital socks today, be sure to remove them at least once a day to inspect your toes or feet, even if you're not changing the wraps or dressings underneath. If you see anything concerning (redness, excess moisture, etc), please call and let us know right away. Should you experience any significant changes in your wound(s) (including redness, increased warmth, increased pain, increased drainage, odor, or fever) or have questions about your wound care, please contact the [employer] at [phone] Monday-Thursday from 8:00 am - 4:30 pm, or Friday from 8:00 am - 2:30 pm.  If you need help with your wound outside these hours and cannot wait until we are again available, contact your home-care company (if applicable), your PCP, or go to the nearest emergency room.

## 2022-10-26 NOTE — PROGRESS NOTES
[facility] Progress Note    Erika Chaudhary     : 1950    DATE OF VISIT:  10/14/2022    Subjective:     Erika Chaudhary is a 67 y.o. female who has a dehisced surgical ulcer located on the right, posterior (Achilles area of the) ankle. Significant symptoms or pertinent wound history since last visit: feeling pretty well, very mild pain when wearing her immobilizer boot. No fever or chills, not much drainage, no falls, doing fine with dressings, no pruritus. Additional ulcer(s) noted? no.      Her current medication list consists of Biotin, Vitamin D3, amitriptyline, aspirin, atorvastatin, fenofibrate, gabapentin, glimepiride, hydroCHLOROthiazide, irbesartan, metFORMIN (MOD), omeprazole, pioglitazone, polyethylene glycol, and vitamin B-12. Allergies: Adhesive tape    Objective:     Vitals:    10/14/22 0851   BP: (!) 150/75   Pulse: (!) 101   Resp: 20   Temp: 98.5 °F (36.9 °C)   TempSrc: Oral   Weight: 174 lb 6 oz (79.1 kg)   Height: 5' 3\" (1.6 m)     Constitutional:  well-developed, well-nourished, NAD  Psychiatric:  oriented to person, place and time; mood and affect appropriate for the situation   Cardiovascular:  bilateral pedal pulses palpable, feet warm, good cap refill; no lower extremity edema  Lymphatic:  no inguinal or popliteal adenopathy, no angitis or cellulitis  Musculoskeletal:  no clubbing, cyanosis or petechiae; RLE and LLE with no gross effusions, joint misalignment or acute arthritis  Ivanna-ulcer skin: indurated, pink, warm, and a bit less hyperkeratotic. Less swelling along the tendon itself than 2 weeks ago, which is encouraging  Ulcer(s):  small oval wound, a couple of mm deep, probably a bit more granular and less fibronecrotic, no signs of infection, some fibrin, presumed biofilm, minor epidermal necrosis; definitely seems somewhat stalled, I think primarily because of how long it's been open. Photos also saved in electronic chart.     Today's Using a curette, I sharply debrided the right, posterior (Achilles area of the) ankle ulcer(s) down through and including the removal of dermis. The type(s) of tissue debrided included fibrin, biofilm, and necrotic/eschar. Total Surface Area Debrided: 1 sq cm. The ulcers were then irrigated with normal saline solution. The procedure was completed with a small amount of bleeding, and hemostasis was with pressure. The patient tolerated the procedure well, with no significant complications. The patient's level of pain during and after the procedure was monitored. Post-debridement measurements, if different from pre-debridement, are in the flowsheet as well. Discharge plan:     Treatment in the wound care center today, per RN documentation: Wound 09/23/22 #1, Right Achilles, Non-healing Surgical Wound, Full Thickness, Onset 7/2021-Dressing/Treatment:  (sureprep, Vashe, collagen, hydrogel). To try to improve granulation tissue, encourage the ulcer to contract, and/or to manage the higher-than usual level of exudate, we elected to apply a disposable NPWT system today (JOAQUÍN). Wound was cleansed with saline after debridement; skin prep applied to periwound skin; first dressing layer(s) were applied as above, then a 10 x 20 cm JOAQUÍN dressing was applied, with the Port in the superior position, and secured with only a length of Medigrip; we didn't use the adhesive strips, since she has a Hx of allergic reaction to various kinds of adhesive tape -- we did apply a small piece of that to another area of skin, to see how she does; if no reaction, and if the JOAQUÍN should loosen at all, she has adhesive strips to use PRN. Pump turned on, functioning well, no leaks. Patient educated to keep dressing dry, watch occasionally for alarm lights, and note if drainage becomes excessive, or accumulates around [place]. Continue with the immobilizer boot for now.     Keep up the good work with protein intake, glucose control, occasional periods of elevation. Written discharge instructions given to patient. Follow up in 1 week.     Electronically signed by Carlos Cordero MD on 10/26/2022 at 9:45 AM.

## 2022-10-28 ENCOUNTER — HOSPITAL ENCOUNTER (OUTPATIENT)
Dept: WOUND CARE | Age: 72
Discharge: HOME | End: 2022-10-28
Payer: MEDICARE

## 2022-10-28 VITALS
SYSTOLIC BLOOD PRESSURE: 144 MMHG | HEIGHT: 63 IN | WEIGHT: 173 LBS | TEMPERATURE: 98.4 F | RESPIRATION RATE: 22 BRPM | BODY MASS INDEX: 30.65 KG/M2 | DIASTOLIC BLOOD PRESSURE: 66 MMHG | HEART RATE: 95 BPM

## 2022-10-28 DIAGNOSIS — T81.31XA SURGICAL WOUND DEHISCENCE, INITIAL ENCOUNTER: Primary | ICD-10-CM

## 2022-10-28 PROCEDURE — 97597 DBRDMT OPN WND 1ST 20 CM/<: CPT | Performed by: INTERNAL MEDICINE

## 2022-10-28 PROCEDURE — 97607 NEG PRS WND THR NDME<=50SQCM: CPT

## 2022-10-28 PROCEDURE — 97597 DBRDMT OPN WND 1ST 20 CM/<: CPT

## 2022-10-28 RX ORDER — BACITRACIN ZINC AND POLYMYXIN B SULFATE 500; 1000 [USP'U]/G; [USP'U]/G
OINTMENT TOPICAL ONCE
Status: CANCELLED | OUTPATIENT
Start: 2022-10-28 | End: 2022-10-28

## 2022-10-28 RX ORDER — LIDOCAINE 40 MG/G
CREAM TOPICAL ONCE
Status: CANCELLED | OUTPATIENT
Start: 2022-10-28 | End: 2022-10-28

## 2022-10-28 RX ORDER — LIDOCAINE 50 MG/G
OINTMENT TOPICAL ONCE
Status: CANCELLED | OUTPATIENT
Start: 2022-10-28 | End: 2022-10-28

## 2022-10-28 RX ORDER — LIDOCAINE 40 MG/G
CREAM TOPICAL ONCE
Status: DISCONTINUED | OUTPATIENT
Start: 2022-10-28 | End: 2022-10-29 | Stop reason: HOSPADM

## 2022-10-28 RX ORDER — LIDOCAINE HYDROCHLORIDE 40 MG/ML
SOLUTION TOPICAL ONCE
Status: CANCELLED | OUTPATIENT
Start: 2022-10-28 | End: 2022-10-28

## 2022-10-28 NOTE — PLAN OF CARE
Patient seen in [address] [place] [address],[address] today for follow up. Wound shows signs of improvement today. Debridement per Dr. New Fails. JOAQUÍN dressing tolerated well last week. Will continue this week. She may stop using boot completely this week if she continues to not have pain. Care plan continued. F/u in [address] [place] [address],[address] in 1 week as ordered, pt. Aware to call sooner with any changes or questions/concerns. Discharge instructions reviewed with patient, all questions answered, copy given to patient. Dressings were applied to all wounds per M.D. Instructions at this visit.

## 2022-10-31 NOTE — DISCHARGE INSTRUCTIONS
[address] [facility] Physician Orders and Discharge [address]  [address], [address]  [place], [facility]  Telephone: [phone]      Fax: [phone]        Your home care company:   N/a     Your wound-care supplies will be provided by:  Patient     NAME:  Sushma Sheth   YOB: 1950  PRIMARY DIAGNOSIS FOR WOUND CARE CENTER:  Non-healing surgical wound     Wound cleansing:   Do not scrub or use excessive force. Wash hands with soap and water before and after dressing changes. Prior to applying a clean dressing, cleanse wound with normal saline, wound cleanser, or mild soap and water. Ask your physician or nurse before getting the wound(s) wet in the shower. Wound care for home:     Right Achilles Wound:  Vashe to wound after cleansing wound spray 2-3 sprays into wound let sit about 60 seconds, do not rinse then apply your dressing   Purachol and PSO to wound  Dry Dressing   Change daily     Keep dressing clean, dry and in place for the week           Please note, all wounds (unless stated otherwise here) were mechanically debrided at the time of cleansing here in the wound-care center today, so a small amount of pain, drainage or bleeding from that process might be expected, and is normal.      All products for home use, including multiple products for a single wound if applicable, are medically necessary in order to achieve the best chance at timely wound healing. See provider documentation for details if needed.      Substituted dressings applied in the [facility] today, if applicable:     N/a     New orders for this week (labs, imaging, medications, etc.):  Ok to stop wearing boot as long as you are not having pain      Additional instructions for specific diagnoses:     N/a           F/U Appointment is with Dr. Edilma Lorenzo in 1 week, on                                   at                       .     Your nurse  is Billye Goldmann, RN If we applied slip-resistant hospital socks today, be sure to remove them at least once a day to inspect your toes or feet, even if you're not changing the wraps or dressings underneath. If you see anything concerning (redness, excess moisture, etc), please call and let us know right away. Should you experience any significant changes in your wound(s) (including redness, increased warmth, increased pain, increased drainage, odor, or fever) or have questions about your wound care, please contact the [address] [place] [address] at [phone] Monday-Thursday from 8:00 am - 4:30 pm, or Friday from 8:00 am - 2:30 pm.  If you need help with your wound outside these hours and cannot wait until we are again available, contact your home-care company (if applicable), your PCP, or go to the nearest emergency room.

## 2022-11-04 ENCOUNTER — HOSPITAL ENCOUNTER (OUTPATIENT)
Dept: WOUND CARE | Age: 72
Discharge: HOME | End: 2022-11-04
Payer: MEDICARE

## 2022-11-04 VITALS
WEIGHT: 172 LBS | HEART RATE: 88 BPM | HEIGHT: 63 IN | DIASTOLIC BLOOD PRESSURE: 75 MMHG | SYSTOLIC BLOOD PRESSURE: 116 MMHG | TEMPERATURE: 97.8 F | RESPIRATION RATE: 18 BRPM | BODY MASS INDEX: 30.48 KG/M2

## 2022-11-04 DIAGNOSIS — T81.31XA SURGICAL WOUND DEHISCENCE, INITIAL ENCOUNTER: Primary | ICD-10-CM

## 2022-11-04 PROCEDURE — 97597 DBRDMT OPN WND 1ST 20 CM/<: CPT

## 2022-11-04 RX ORDER — LIDOCAINE 40 MG/G
CREAM TOPICAL ONCE
Status: CANCELLED | OUTPATIENT
Start: 2022-11-04 | End: 2022-11-04

## 2022-11-04 RX ORDER — LIDOCAINE HYDROCHLORIDE 40 MG/ML
SOLUTION TOPICAL ONCE
Status: CANCELLED | OUTPATIENT
Start: 2022-11-04 | End: 2022-11-04

## 2022-11-04 RX ORDER — BACITRACIN ZINC AND POLYMYXIN B SULFATE 500; 1000 [USP'U]/G; [USP'U]/G
OINTMENT TOPICAL ONCE
Status: CANCELLED | OUTPATIENT
Start: 2022-11-04 | End: 2022-11-04

## 2022-11-04 RX ORDER — LIDOCAINE 50 MG/G
OINTMENT TOPICAL ONCE
Status: CANCELLED | OUTPATIENT
Start: 2022-11-04 | End: 2022-11-04

## 2022-11-04 RX ORDER — LIDOCAINE 40 MG/G
CREAM TOPICAL ONCE
Status: DISCONTINUED | OUTPATIENT
Start: 2022-11-04 | End: 2022-11-05 | Stop reason: HOSPADM

## 2022-11-04 NOTE — PLAN OF CARE
Patient seen in [address] [place] [address],[address] today for follow up. Patient reports tolerating JOAQUÍN well. Wound bed showing signs of improvement. Patient denies significant pain. Debridement done per Dr. New Fails. No JOAQUÍN this week. Patient agreeable. F/u in [address] [place] [address],[address] in 1 week as ordered, pt. Aware to call sooner with any changes or questions/concerns. Discharge instructions reviewed with patient, all questions answered, copy given to patient. Dressings were applied to all wounds per M.D. Instructions at this visit.

## 2022-11-07 NOTE — DISCHARGE INSTRUCTIONS
215 [facility] Physician Orders and Discharge 800 [facility]  [address], [address]  [place], [facility]  Telephone: [phone]      Fax: [phone]        Your home care company:   N/a     Your wound-care supplies will be provided by:  Patient     NAME:  Frances Marino   YOB: 1950  PRIMARY DIAGNOSIS FOR WOUND CARE CENTER:  Non-healing surgical wound     Wound cleansing:   Do not scrub or use excessive force. Wash hands with soap and water before and after dressing changes. Prior to applying a clean dressing, cleanse wound with normal saline, wound cleanser, or mild soap and water. Ask your physician or nurse before getting the wound(s) wet in the shower. Wound care for home:     Right Achilles Wound:  Vashe to wound after cleansing wound spray 2-3 sprays into wound let sit about 60 seconds, do not rinse then apply your dressing   Multidex - you may purchase this in the pharmacy down the [address]aga  Purachol   Dry Dressing   Change daily      Keep dressing clean, dry and in place for the week            Please note, all wounds (unless stated otherwise here) were mechanically debrided at the time of cleansing here in the wound-care center today, so a small amount of pain, drainage or bleeding from that process might be expected, and is normal.      All products for home use, including multiple products for a single wound if applicable, are medically necessary in order to achieve the best chance at timely wound healing. See provider documentation for details if needed.      Substituted dressings applied in the [address] [place] [address],[address] today, if applicable:     N/a     New orders for this week (labs, imaging, medications, etc.):  Ok to stop wearing boot as long as you are not having pain      Additional instructions for specific diagnoses:     N/a           F/U Appointment is with Dr. Brandi Cullen in 2 weeks  on                                   at .     Your nurse  is Tenzin Miranda RN      If we applied slip-resistant hospital socks today, be sure to remove them at least once a day to inspect your toes or feet, even if you're not changing the wraps or dressings underneath. If you see anything concerning (redness, excess moisture, etc), please call and let us know right away. Should you experience any significant changes in your wound(s) (including redness, increased warmth, increased pain, increased drainage, odor, or fever) or have questions about your wound care, please contact the [facility] at [phone] Monday-Thursday from 8:00 am - 4:30 pm, or Friday from 8:00 am - 2:30 pm.  If you need help with your wound outside these hours and cannot wait until we are again available, contact your home-care company (if applicable), your PCP, or go to the nearest emergency room.

## 2022-11-07 NOTE — PROGRESS NOTES
88 [facility] Progress Note    Uday Davies     : 1950    DATE OF VISIT:  10/21/2022    Subjective:     Uday Davies is a 67 y.o. female who has a dehisced surgical ulcer located on the right, posterior (Achilles area of the) ankle. Significant symptoms or pertinent wound history since last visit: feeling well overall, tolerated the JOAQUÍN well, and it stayed in place for the week, no alarm lights. No fever, very mild pain, no pruritus. Additional ulcer(s) noted? no.      Her current medication list consists of Biotin, Vitamin D3, amitriptyline, aspirin, atorvastatin, fenofibrate, gabapentin, glimepiride, hydroCHLOROthiazide, irbesartan, metFORMIN (MOD), omeprazole, pioglitazone, polyethylene glycol, and vitamin B-12. Allergies: Adhesive tape    Objective:     Vitals:    10/21/22 1054   BP: 131/72   Pulse: (!) 105   Resp: 20   Temp: 97.2 °F (36.2 °C)   TempSrc: Oral   Weight: 173 lb 2 oz (78.5 kg)   Height: 5' 3\" (1.6 m)     Constitutional:  well-developed, well-nourished, NAD   Psychiatric:  oriented to person, place and time; mood and affect appropriate for the situation   Cardiovascular:  bilateral pedal pulses palpable, feet warm, good cap refill; no lower extremity edema  Lymphatic:  no inguinal or popliteal adenopathy, no angitis or cellulitis  Musculoskeletal:  no clubbing, cyanosis or petechiae; RLE and LLE with no gross effusions, joint misalignment or acute arthritis  Ivanna-ulcer skin: indurated, pink, warm, less hyperkeratotic. Less swelling along the tendon itself than 2-3 weeks ago, which is encouraging  Ulcer(s):  small oval wound, a couple of mm deep, probably a bit more granular and less fibronecrotic, no signs of infection, some fibrin, presumed biofilm, minor epidermal necrosis, a bit of SQ necrosis. Photos also saved in electronic chart.     Today's wound measurements, per RN documentation:  Wound 22 #1, Right Achilles, Non-healing Surgical Wound, Full Thickness, Onset 7/2021-Wound Length (cm): 0.6 cm    Wound 09/23/22 #1, Right Achilles, Non-healing Surgical Wound, Full Thickness, Onset 7/2021-Wound Width (cm): 0.3 cm    Wound 09/23/22 #1, Right Achilles, Non-healing Surgical Wound, Full Thickness, Onset 7/2021-Wound Depth (cm): 0.1 cm    Assessment:     Patient Active Problem List   Diagnosis Code    Carpal tunnel syndrome, right G56.01    DM type 2 (diabetes mellitus, type 2) ([facility]) E11.9    Essential hypertension I10    Gastroesophageal reflux disease K21.9    Hemochromatosis carrier Z14.8    Insomnia with sleep apnea G47.00, G47.30    Localized osteoarthrosis M19.90    Other and unspecified hyperlipidemia E78.5    Class 1 obesity due to excess calories with serious comorbidity and body mass index (BMI) of 30.0 to 30.9 in adult E66.09, Z68.30    Obstructive sleep apnea on CPAP G47.33, Z99.89    Spinal stenosis of lumbar region without neurogenic claudication M48.061    Spondylolisthesis M43.10    Trigger middle finger of right hand M65.331    Surgical wound dehiscence, initial encounter T81.31XA       Assessment of today's active condition(s): Hx Achilles tendon rupture, repair, small area of surgical dehiscence, moving very slowly toward healing because of its age, primarily -- perhaps a bit better this week after a week of JOAQUÍN, at least enough encouragement to place one for another couple of weeks. Factors contributing to occurrence and/or persistence of the chronic ulcer include diabetes. Medical necessity of today's visit is shown by the above documentation. Sharp debridement is indicated today, based upon the exam findings in the wound(s) above. Procedure note:     Consent obtained. Time out performed per [facility] policy.     Anesthetic  Anesthetic: 4% Lidocaine Cream     Using a curette and #15 blade scalpel, I sharply debrided the right, posterior (Achilles area of the) ankle ulcer(s) down through and including the removal of subcutaneous tissue. The type(s) of tissue debrided included fibrin, biofilm, and necrotic/eschar. Total Surface Area Debrided: 1 sq cm. The ulcers were then irrigated with normal saline solution. The procedure was completed with a small amount of bleeding, and hemostasis was with pressure. The patient tolerated the procedure well, with no significant complications. The patient's level of pain during and after the procedure was monitored. Post-debridement measurements, if different from pre-debridement, are in the flowsheet as well. Discharge plan:     Treatment in the wound care center today, per RN documentation: Wound 09/23/22 #1, Right Achilles, Non-healing Surgical Wound, Full Thickness, Onset 7/2021-Dressing/Treatment: Other (comment) (Vashe, skin prep collagen hydrogel spandagrip). To try to improve granulation tissue, encourage the ulcer to contract, and/or to manage the higher-than usual level of exudate, we elected to apply a disposable NPWT system today (JOAQUÍN). Wound was cleansed with saline after debridement; skin prep applied to periwound skin; first dressing layer(s) were applied as above, then a 10 x 20 cm JOAQUÍN dressing was applied, with the Port in the superior position, and NOT secured with adhesive strips (because of possible allergy). Pump turned on, functioning well, no leaks. Patient educated to keep dressing dry, watch occasionally for alarm lights, and note if drainage becomes excessive, or accumulates around [address]. Would continue to wear the supportive boot if she has any ankle discomfort, or will be on her feet for any extended period of time    Written discharge instructions given to patient. Follow up in 1 week.     Electronically signed by Campbell Barahona MD on 11/7/2022 at 11:07 AM.

## 2022-11-07 NOTE — PROGRESS NOTES
[facility] Progress Note    Catherine Lowery     : 1950    DATE OF VISIT:  10/28/2022    Subjective:     Catherine Lowery is a 67 y.o. female who has a dehisced surgical ulcer located on the right, posterior (Achilles area of the) ankle. Significant symptoms or pertinent wound history since last visit: feeling well, no fever, really no pain, not wearing her boot now, JOAQUÍN did well. Additional ulcer(s) noted? no.      Her current medication list consists of Biotin, Vitamin D3, amitriptyline, aspirin, atorvastatin, fenofibrate, gabapentin, glimepiride, hydroCHLOROthiazide, irbesartan, metFORMIN (MOD), omeprazole, pioglitazone, polyethylene glycol, and vitamin B-12. Allergies: Adhesive tape    Objective:     Vitals:    10/28/22 0829   BP: (!) 144/66   Pulse: 95   Resp: 22   Temp: 98.4 °F (36.9 °C)   TempSrc: Oral   Weight: 173 lb (78.5 kg)   Height: 5' 3\" (1.6 m)     Constitutional:  well-developed, well-nourished, NAD   Psychiatric:  oriented to person, place and time; mood and affect appropriate for the situation   Cardiovascular:  bilateral pedal pulses palpable, feet warm, good cap refill; no lower extremity edema  Lymphatic:  no inguinal or popliteal adenopathy, no angitis or cellulitis  Musculoskeletal:  no clubbing, cyanosis or petechiae; RLE and LLE with no gross effusions, joint misalignment or acute arthritis  Ivanna-ulcer skin: indurated, pink, warm, less hyperkeratotic. Less swelling along the tendon itself than 2-3 weeks ago, which is encouraging  Ulcer(s):  small oval wound, a couple of mm deep, a bit more granular and less fibronecrotic, no signs of infection, some fibrin, presumed biofilm, less epidermal necrosis, no SQ necrosis now, just a bit smaller. Photos also saved in electronic chart.     Today's wound measurements, per RN documentation:  Wound 22 #1, Right Achilles, Non-healing Surgical Wound, Full Thickness, Onset 2021-Wound Length (cm): 0.4 cm    Wound 09/23/22 #1, Right Achilles, Non-healing Surgical Wound, Full Thickness, Onset 7/2021-Wound Width (cm): 0.3 cm    Wound 09/23/22 #1, Right Achilles, Non-healing Surgical Wound, Full Thickness, Onset 7/2021-Wound Depth (cm): 0.1 cm    Assessment:     Patient Active Problem List   Diagnosis Code    Carpal tunnel syndrome, right G56.01    DM type 2 (diabetes mellitus, type 2) ([facility]) E11.9    Essential hypertension I10    Gastroesophageal reflux disease K21.9    Hemochromatosis carrier Z14.8    Insomnia with sleep apnea G47.00, G47.30    Localized osteoarthrosis M19.90    Other and unspecified hyperlipidemia E78.5    Class 1 obesity due to excess calories with serious comorbidity and body mass index (BMI) of 30.0 to 30.9 in adult E66.09, Z68.30    Obstructive sleep apnea on CPAP G47.33, Z99.89    Spinal stenosis of lumbar region without neurogenic claudication M48.061    Spondylolisthesis M43.10    Trigger middle finger of right hand M65.331    Surgical wound dehiscence, initial encounter T81.31XA       Assessment of today's active condition(s): Hx Achilles rupture, repair, small but stubborn surgical wound dehiscence, making a bit of progress with immobilization, debridement, standard dressings for a couple of weeks, now a couple of weeks of JOAQUÍN NPWT. Factors contributing to occurrence and/or persistence of the chronic ulcer include diabetes and shear force. Medical necessity of today's visit is shown by the above documentation. Sharp debridement is indicated today, based upon the exam findings in the wound(s) above. Procedure note:     Consent obtained. Time out performed per [facility] policy. Anesthetic  Anesthetic: 4% Lidocaine Cream     Using a curette and #15 blade scalpel, I sharply debrided the right, posterior (Achilles area of the) ankle ulcer(s) down through and including the removal of dermis. The type(s) of tissue debrided included fibrin, biofilm, and necrotic/eschar.  Total Surface Area Debrided: 1 sq cm. The ulcers were then irrigated with normal saline solution. The procedure was completed with a small amount of bleeding, and hemostasis was with pressure. The patient tolerated the procedure well, with no significant complications. The patient's level of pain during and after the procedure was monitored. Post-debridement measurements, if different from pre-debridement, are in the flowsheet as well. Discharge plan:     Treatment in the wound care center today, per RN documentation: Wound 09/23/22 #1, Right Achilles, Non-healing Surgical Wound, Full Thickness, Onset 7/2021-Dressing/Treatment: Other (comment) (Nexodyn, Multidex, Puracol, skin prep, double Elinor Grater). To try to improve granulation tissue, encourage the ulcer to contract, and/or to manage the higher-than usual level of exudate, we elected to apply a disposable NPWT system today (JOAQUÍN). Wound was cleansed with saline after debridement; skin prep applied to periwound skin; first dressing layer(s) were applied as above, then a 10 x 20 cm JOAQUÍN dressing was applied, with the Port in the superior position, but NOT secured with adhesive strips. Pump turned on, functioning well, no leaks. Patient educated to keep dressing dry, watch occasionally for alarm lights, and note if drainage becomes excessive, or accumulates around [address]. Written discharge instructions given to patient. Follow up in 1 week.     Electronically signed by Sangeeta Hale MD on 11/7/2022 at 11:46 AM.

## 2022-11-11 ENCOUNTER — HOSPITAL ENCOUNTER (OUTPATIENT)
Dept: WOUND CARE | Age: 72
Discharge: HOME | End: 2022-11-11
Payer: MEDICARE

## 2022-11-11 VITALS
HEIGHT: 63 IN | BODY MASS INDEX: 30.51 KG/M2 | RESPIRATION RATE: 18 BRPM | DIASTOLIC BLOOD PRESSURE: 67 MMHG | TEMPERATURE: 97.7 F | SYSTOLIC BLOOD PRESSURE: 139 MMHG | WEIGHT: 172.2 LBS | HEART RATE: 95 BPM

## 2022-11-11 DIAGNOSIS — T81.31XA SURGICAL WOUND DEHISCENCE, INITIAL ENCOUNTER: Primary | ICD-10-CM

## 2022-11-11 PROCEDURE — 97597 DBRDMT OPN WND 1ST 20 CM/<: CPT

## 2022-11-11 RX ORDER — LIDOCAINE HYDROCHLORIDE 40 MG/ML
SOLUTION TOPICAL ONCE
OUTPATIENT
Start: 2022-11-11 | End: 2022-11-11

## 2022-11-11 RX ORDER — LIDOCAINE 50 MG/G
OINTMENT TOPICAL ONCE
OUTPATIENT
Start: 2022-11-11 | End: 2022-11-11

## 2022-11-11 RX ORDER — BACITRACIN ZINC AND POLYMYXIN B SULFATE 500; 1000 [USP'U]/G; [USP'U]/G
OINTMENT TOPICAL ONCE
OUTPATIENT
Start: 2022-11-11 | End: 2022-11-11

## 2022-11-11 RX ORDER — LIDOCAINE 40 MG/G
CREAM TOPICAL ONCE
Status: DISCONTINUED | OUTPATIENT
Start: 2022-11-11 | End: 2022-11-12 | Stop reason: HOSPADM

## 2022-11-11 RX ORDER — LIDOCAINE 40 MG/G
CREAM TOPICAL ONCE
Status: CANCELLED | OUTPATIENT
Start: 2022-11-11 | End: 2022-11-11

## 2022-11-11 NOTE — PLAN OF CARE
Patient seen in [facility] today. Patient denies any issues with wound this week. Wound debridement per Dr. Sheyla Goodrich. Tolerated well. Patient leaving for vacation today. Will continue to hold off on JOAQUÍN for now. Will begin using multidex on wound. Patient will purchase this today. Patient to follow up in [facility] in 2 weeks. Discharge instructions reviewed with patient, all questions answered, copy given to patient. Dressings were applied to all wounds per M.D. Instructions at this visit.

## 2022-11-15 NOTE — DISCHARGE INSTRUCTIONS
215 [facility] Physician Orders and Discharge [address], [address]  [place], [facility]  Telephone: [phone]      Fax: [phone]        Your home care company:   N/a     Your wound-care supplies will be provided by:  Patient     NAME:  Suzanne Cardenas   YOB: 1950  PRIMARY DIAGNOSIS FOR WOUND CARE CENTER:  Non-healing surgical wound     Wound cleansing:   Do not scrub or use excessive force. Wash hands with soap and water before and after dressing changes. Prior to applying a clean dressing, cleanse wound with normal saline, wound cleanser, or mild soap and water. Ask your physician or nurse before getting the wound(s) wet in the shower. Wound care for home:     Right Achilles Wound:  Vashe to wound after cleansing wound spray 2-3 sprays into wound let sit about 60 seconds, do not rinse then apply your dressing   Multidex - you may purchase this in the pharmacy down the [address]  Purachol   Dry Dressing   Change daily      Keep dressing clean, dry and in place for the week            Please note, all wounds (unless stated otherwise here) were mechanically debrided at the time of cleansing here in the wound-care center today, so a small amount of pain, drainage or bleeding from that process might be expected, and is normal.      All products for home use, including multiple products for a single wound if applicable, are medically necessary in order to achieve the best chance at timely wound healing. See provider documentation for details if needed.      [facility], if applicable:     N/a     New orders for this week (labs, imaging, medications, etc.):  Ok to stop wearing boot as long as you are not having pain      Additional instructions for specific diagnoses:     N/a           F/U Appointment is with Dr. Francis Mock in 1 week on                                   at .     Your nurse  is Grant Kennedy RN      If we applied slip-resistant hospital socks today, be sure to remove them at least once a day to inspect your toes or feet, even if you're not changing the wraps or dressings underneath. If you see anything concerning (redness, excess moisture, etc), please call and let us know right away. Should you experience any significant changes in your wound(s) (including redness, increased warmth, increased pain, increased drainage, odor, or fever) or have questions about your wound care, please contact the [address] [place] [address] at [phone] Monday-Thursday from 8:00 am - 4:30 pm, or Friday from 8:00 am - 2:30 pm.  If you need help with your wound outside these hours and cannot wait until we are again available, contact your home-care company (if applicable), your PCP, or go to the nearest emergency room.

## 2022-11-21 ENCOUNTER — HOSPITAL ENCOUNTER (OUTPATIENT)
Dept: WOUND CARE | Age: 72
Discharge: HOME | End: 2022-11-21
Payer: MEDICARE

## 2022-11-21 VITALS
RESPIRATION RATE: 18 BRPM | SYSTOLIC BLOOD PRESSURE: 147 MMHG | HEART RATE: 103 BPM | WEIGHT: 173.8 LBS | HEIGHT: 63 IN | DIASTOLIC BLOOD PRESSURE: 75 MMHG | BODY MASS INDEX: 30.79 KG/M2 | TEMPERATURE: 97.9 F

## 2022-11-21 DIAGNOSIS — T81.31XA SURGICAL WOUND DEHISCENCE, INITIAL ENCOUNTER: Primary | ICD-10-CM

## 2022-11-21 PROCEDURE — 97597 DBRDMT OPN WND 1ST 20 CM/<: CPT

## 2022-11-21 RX ORDER — LIDOCAINE 40 MG/G
CREAM TOPICAL ONCE
OUTPATIENT
Start: 2022-11-21 | End: 2022-11-21

## 2022-11-21 RX ORDER — LIDOCAINE HYDROCHLORIDE 40 MG/ML
SOLUTION TOPICAL ONCE
OUTPATIENT
Start: 2022-11-21 | End: 2022-11-21

## 2022-11-21 RX ORDER — LIDOCAINE 50 MG/G
OINTMENT TOPICAL ONCE
OUTPATIENT
Start: 2022-11-21 | End: 2022-11-21

## 2022-11-21 RX ORDER — LIDOCAINE 40 MG/G
CREAM TOPICAL ONCE
Status: DISCONTINUED | OUTPATIENT
Start: 2022-11-21 | End: 2022-11-22 | Stop reason: HOSPADM

## 2022-11-21 RX ORDER — BACITRACIN ZINC AND POLYMYXIN B SULFATE 500; 1000 [USP'U]/G; [USP'U]/G
OINTMENT TOPICAL ONCE
OUTPATIENT
Start: 2022-11-21 | End: 2022-11-21

## 2022-11-21 ASSESSMENT — PAIN SCALES - GENERAL: PAINLEVEL_OUTOF10: 0

## 2022-11-21 NOTE — PLAN OF CARE
Pt to the [facility] for follow up appointment. Wound measuring smaller and was debrided today. Pt to continue with multidex and purachol to wound. Pt to follow up in the [facility] in 1 week. Discharge instructions reviewed with patient, all questions answered, copy given to patient. Dressings were applied to all wounds per M.D. Instructions at this visit.

## 2022-11-28 NOTE — DISCHARGE INSTRUCTIONS
215 [facility] Physician Orders and Discharge 800 [facility]  [address], [address]  [place] [facility]  Telephone: [phone]      Fax: [phone]        Your home care company:   N/a     Your wound-care supplies will be provided by:  Patient     NAME:  Randal Lee   YOB: 1950  PRIMARY DIAGNOSIS FOR WOUND CARE CENTER:  Non-healing surgical wound     Wound cleansing:   Do not scrub or use excessive force. Wash hands with soap and water before and after dressing changes. Prior to applying a clean dressing, cleanse wound with normal saline, wound cleanser, or mild soap and water. Ask your physician or nurse before getting the wound(s) wet in the shower. Wound care for home:     Right Achilles Wound:  Vashe to wound after cleansing wound spray 2-3 sprays into wound let sit about 60 seconds, do not rinse then apply your dressing   Multidex - you may purchase this in the pharmacy down the [address]  Purachol   Dry Dressing   Change daily      Keep dressing clean, dry and in place for the week            Please note, all wounds (unless stated otherwise here) were mechanically debrided at the time of cleansing here in the wound-care center today, so a small amount of pain, drainage or bleeding from that process might be expected, and is normal.      All products for home use, including multiple products for a single wound if applicable, are medically necessary in order to achieve the best chance at timely wound healing. See provider documentation for details if needed.      Substituted dressings applied in the [address] [place] [address],[address] today, if applicable:     N/a     New orders for this week (labs, imaging, medications, etc.):  Ok to stop wearing boot as long as you are not having pain      Additional instructions for specific diagnoses:     N/a           F/U Appointment is with Dr. Devin Brice in 1 week on                                   at .     Your nurse  is Maira Shore RN      If we applied slip-resistant hospital socks today, be sure to remove them at least once a day to inspect your toes or feet, even if you're not changing the wraps or dressings underneath. If you see anything concerning (redness, excess moisture, etc), please call and let us know right away. Should you experience any significant changes in your wound(s) (including redness, increased warmth, increased pain, increased drainage, odor, or fever) or have questions about your wound care, please contact the [address] [place] [address] at [phone] Monday-Thursday from 8:00 am - 4:30 pm, or Friday from 8:00 am - 2:30 pm.  If you need help with your wound outside these hours and cannot wait until we are again available, contact your home-care company (if applicable), your PCP, or go to the nearest emergency room.

## 2022-12-01 NOTE — PROGRESS NOTES
[facility] Progress Note    Randal Lee     : 1950    DATE OF VISIT:  2022    Subjective:     Randal Lee is a 67 y.o. female who has a dehisced surgical ulcer located on the right, posterior (Achilles area of the) ankle. Significant symptoms or pertinent wound history since last visit: feeling ok, little if any pain, no fever, no pruritus, no falls, no real swelling. Walking comfortably now without her immobilizer boot, with no wound pain when doing so. Additional ulcer(s) noted? no.      Her current medication list consists of Biotin, Vitamin D3, amitriptyline, aspirin, atorvastatin, fenofibrate, gabapentin, glimepiride, hydroCHLOROthiazide, irbesartan, metFORMIN (MOD), omeprazole, pioglitazone, polyethylene glycol, and vitamin B-12. Allergies: Adhesive tape    Objective:     Vitals:    22 0917   BP: 139/67   Pulse: 95   Resp: 18   Temp: 97.7 °F (36.5 °C)   TempSrc: Oral   Weight: 172 lb 3.2 oz (78.1 kg)   Height: 5' 3\" (1.6 m)     Constitutional:  well-developed, well-nourished, NAD   Psychiatric:  oriented to person, place and time; mood and affect appropriate for the situation   Cardiovascular:  bilateral pedal pulses palpable, feet warm, good cap refill; no lower extremity edema  Lymphatic:  no inguinal or popliteal adenopathy, no angitis or cellulitis  Musculoskeletal:  no clubbing, cyanosis or petechiae; RLE and LLE with no gross effusions, joint misalignment or acute arthritis  Ivanna-ulcer skin: less indurated, pink, warm, dry, less hyperkeratotic. Ulcer(s):  small oval wound, a couple of mm deep, a bit more granular and less fibronecrotic, no signs of infection, some fibrin, presumed biofilm, no SQ necrosis now, just a bit smaller - changes are slight and slow to develop, but noticeable. Photos also saved in electronic chart.     Today's wound measurements, per RN documentation:  Wound 22 #1, Right Achilles, Non-healing Surgical Wound, Full Thickness, Onset 7/2021-Wound Length (cm): 0.6 cm    Wound 09/23/22 #1, Right Achilles, Non-healing Surgical Wound, Full Thickness, Onset 7/2021-Wound Width (cm): 0.3 cm    Wound 09/23/22 #1, Right Achilles, Non-healing Surgical Wound, Full Thickness, Onset 7/2021-Wound Depth (cm): 0.1 cm    Assessment:     Patient Active Problem List   Diagnosis Code    Carpal tunnel syndrome, right G56.01    DM type 2 (diabetes mellitus, type 2) ([facility]) E11.9    Essential hypertension I10    Gastroesophageal reflux disease K21.9    Hemochromatosis carrier Z14.8    Insomnia with sleep apnea G47.00, G47.30    Localized osteoarthrosis M19.90    Other and unspecified hyperlipidemia E78.5    Class 1 obesity due to excess calories with serious comorbidity and body mass index (BMI) of 30.0 to 30.9 in adult E66.09, Z68.30    Obstructive sleep apnea on CPAP G47.33, Z99.89    Spinal stenosis of lumbar region without neurogenic claudication M48.061    Spondylolisthesis M43.10    Trigger middle finger of right hand M65.331    Surgical wound dehiscence, initial encounter T81.31XA       Assessment of today's active condition(s): Hx Achilles tendon rupture, repair, segment of surgical wound dehiscence, slow to heal after months of not having regular treatment, and over a hypovascular structure like the Achilles, but I think we're making a bit of progress. Factors contributing to occurrence and/or persistence of the chronic ulcer include diabetes and shear force. Medical necessity of today's visit is shown by the above documentation. Sharp debridement is indicated today, based upon the exam findings in the wound(s) above. Procedure note:     Consent obtained. Time out performed per [facility] policy. Anesthetic  Anesthetic: 4% Lidocaine Cream     Using a curette and forceps, I sharply debrided the right, posterior (Achilles area of the) ankle ulcer(s) down through and including the removal of dermis.  The type(s) of tissue debrided included fibrin, biofilm, and necrotic/eschar. Total Surface Area Debrided: 1 sq cm. The ulcers were then irrigated with normal saline solution. The procedure was completed with a small amount of bleeding, and hemostasis was with pressure. The patient tolerated the procedure well, with no significant complications. The patient's level of pain during and after the procedure was monitored. Post-debridement measurements, if different from pre-debridement, are in the flowsheet as well. Discharge plan:     Treatment in the wound care center today, per RN documentation: Wound 09/23/22 #1, Right Achilles, Non-healing Surgical Wound, Full Thickness, Onset 7/2021-Dressing/Treatment: Other (comment) (Vashe, multidex, Puracol, 4x4's, tasia,tape). Will add a bit of Multidex, to do all we can to try to stimulate more granulation tissue. Home treatment: good handwashing before and after any dressing changes. Cleanse wound with saline or soap & water before dressing change. May use Vaseline (petrolatum), Aquaphor, Aveeno, CeraVe, Cetaphil, Eucerin, Lubriderm, etc for dry skin. Dressing type for home: as above, once daily. Written discharge instructions given to patient. Follow up in 2 weeks, as I'm out of town next week.     Electronically signed by Jaron Dickens MD on 12/1/2022 at 10:15 AM.

## 2022-12-01 NOTE — PROGRESS NOTES
[facility] Progress Note    Ann Padilla     : 1950    DATE OF VISIT:  2022    Subjective:     Ann Padilla is a 67 y.o. female who has a dehisced surgical ulcer located on the right, posterior (Achilles area of the) ankle. Significant symptoms or pertinent wound history since last visit: feeling pretty well, little pain, mild pruritus, no real swelling, no fever, modest wound drainage. Additional ulcer(s) noted? no.      Her current medication list consists of Biotin, Vitamin D3, amitriptyline, aspirin, atorvastatin, fenofibrate, gabapentin, glimepiride, hydroCHLOROthiazide, irbesartan, metFORMIN (MOD), omeprazole, pioglitazone, polyethylene glycol, and vitamin B-12. Allergies: Adhesive tape    Objective:     Vitals:    22 1128   BP: 116/75   Pulse: 88   Resp: 18   Temp: 97.8 °F (36.6 °C)   TempSrc: Oral   Weight: 172 lb (78 kg)   Height: 5' 3\" (1.6 m)     Constitutional:  well-developed, well-nourished, NAD   Psychiatric:  oriented to person, place and time; mood and affect appropriate for the situation   Cardiovascular:  bilateral pedal pulses palpable, feet warm, good cap refill; no lower extremity edema  Lymphatic:  no inguinal or popliteal adenopathy, no angitis or cellulitis  Musculoskeletal:  no clubbing, cyanosis or petechiae; RLE and LLE with no gross effusions, joint misalignment or acute arthritis  Ivanna-ulcer skin: indurated, pink, warm, less hyperkeratotic. Less swelling along the tendon itself than weeks ago, which is encouraging  Ulcer(s):  small oval wound, a couple of mm deep, a bit more granular and less fibronecrotic, no signs of infection, some fibrin, presumed biofilm, less epidermal necrosis, no SQ necrosis now, just a bit smaller - changes are slight and slow to develop, but noticeable. Photos also saved in electronic chart.     Today's wound measurements, per RN documentation:  Wound 22 #1, Right Achilles, Non-healing Surgical Wound, Full Thickness, Onset 7/2021-Wound Length (cm): 0.4 cm    Wound 09/23/22 #1, Right Achilles, Non-healing Surgical Wound, Full Thickness, Onset 7/2021-Wound Width (cm): 0.3 cm    Wound 09/23/22 #1, Right Achilles, Non-healing Surgical Wound, Full Thickness, Onset 7/2021-Wound Depth (cm): 0.1 cm    Assessment:     Patient Active Problem List   Diagnosis Code    Carpal tunnel syndrome, right G56.01    DM type 2 (diabetes mellitus, type 2) ([facility]) E11.9    Essential hypertension I10    Gastroesophageal reflux disease K21.9    Hemochromatosis carrier Z14.8    Insomnia with sleep apnea G47.00, G47.30    Localized osteoarthrosis M19.90    Other and unspecified hyperlipidemia E78.5    Class 1 obesity due to excess calories with serious comorbidity and body mass index (BMI) of 30.0 to 30.9 in adult E66.09, Z68.30    Obstructive sleep apnea on CPAP G47.33, Z99.89    Spinal stenosis of lumbar region without neurogenic claudication M48.061    Spondylolisthesis M43.10    Trigger middle finger of right hand M65.331    Surgical wound dehiscence, initial encounter T81.31XA       Assessment of today's active condition(s): Hx Achilles tendon rupture, repair, small segment of surgical wound dehiscence, without follow-up for some time, so a very fibrotic wound bed (over a naturally hypovascular structure like the Achilles) -- no signs of infection or ischemia, a bit of progress with debridement, brief immobilization, JOAQUÍN NPWT after a couple of weeks of simpler dressings. Factors contributing to occurrence and/or persistence of the chronic ulcer include diabetes and shear force. Medical necessity of today's visit is shown by the above documentation. Sharp debridement is indicated today, based upon the exam findings in the wound(s) above. Procedure note:     Consent obtained. Time out performed per [facility] policy.     Anesthetic  Anesthetic: 4% Lidocaine Cream     Using a #15 blade scalpel, I sharply debrided the right, posterior (Achilles area of the) ankle ulcer(s) down through and including the removal of dermis. The type(s) of tissue debrided included fibrin, biofilm, and necrotic/eschar. Total Surface Area Debrided: 1 sq cm. The ulcers were then irrigated with normal saline solution. The procedure was completed with a small amount of bleeding, and hemostasis was with pressure. The patient tolerated the procedure well, with no significant complications. The patient's level of pain during and after the procedure was monitored. Post-debridement measurements, if different from pre-debridement, are in the flowsheet as well. Discharge plan:     Treatment in the wound care center today, per RN documentation: Wound 09/23/22 #1, Right Achilles, Non-healing Surgical Wound, Full Thickness, Onset 7/2021-Dressing/Treatment:  (collagen gauze, tasia). The JOAQUÍN did help the wound to granulate a bit, but I think she has enough healing momentum now that we can change back to a simpler daily dressing. Home treatment: good handwashing before and after any dressing changes. Cleanse wound with saline or soap & water before dressing change. May use Vaseline (petrolatum), Aquaphor, Aveeno, CeraVe, Cetaphil, Eucerin, Lubriderm, etc for dry skin. Dressing type for home:  Vashe, collagen, add PSO if wound bed is on the drier side, dry gauze , once daily. Written discharge instructions given to patient. Follow up in 1 week.     Electronically signed by Matthew Cruz MD on 12/1/2022 at 10:11 AM.

## 2022-12-02 ENCOUNTER — HOSPITAL ENCOUNTER (OUTPATIENT)
Dept: WOUND CARE | Age: 72
Discharge: HOME | End: 2022-12-02
Payer: MEDICARE

## 2022-12-02 VITALS
HEART RATE: 89 BPM | HEIGHT: 63 IN | DIASTOLIC BLOOD PRESSURE: 76 MMHG | BODY MASS INDEX: 30.51 KG/M2 | TEMPERATURE: 97.8 F | RESPIRATION RATE: 18 BRPM | WEIGHT: 172.2 LBS | SYSTOLIC BLOOD PRESSURE: 137 MMHG

## 2022-12-02 DIAGNOSIS — T81.31XA SURGICAL WOUND DEHISCENCE, INITIAL ENCOUNTER: Primary | ICD-10-CM

## 2022-12-02 PROCEDURE — 97597 DBRDMT OPN WND 1ST 20 CM/<: CPT

## 2022-12-02 RX ORDER — LIDOCAINE 40 MG/G
CREAM TOPICAL ONCE
Status: DISCONTINUED | OUTPATIENT
Start: 2022-12-02 | End: 2022-12-03 | Stop reason: HOSPADM

## 2022-12-02 RX ORDER — LIDOCAINE HYDROCHLORIDE 40 MG/ML
SOLUTION TOPICAL ONCE
OUTPATIENT
Start: 2022-12-02 | End: 2022-12-02

## 2022-12-02 RX ORDER — LIDOCAINE 50 MG/G
OINTMENT TOPICAL ONCE
OUTPATIENT
Start: 2022-12-02 | End: 2022-12-02

## 2022-12-02 RX ORDER — LIDOCAINE 40 MG/G
CREAM TOPICAL ONCE
OUTPATIENT
Start: 2022-12-02 | End: 2022-12-02

## 2022-12-02 RX ORDER — BACITRACIN ZINC AND POLYMYXIN B SULFATE 500; 1000 [USP'U]/G; [USP'U]/G
OINTMENT TOPICAL ONCE
OUTPATIENT
Start: 2022-12-02 | End: 2022-12-02

## 2022-12-02 NOTE — PLAN OF CARE
Patient seen in [facility] today for follow up. Patient reports no pain this week. Changing bandage daily. Minimal amount of drainage noted during dressing changes. Wound showing signs of improvement. Debridement per Dr. Messi Cee. Patient tolerated well. F/u in [facility] in 1 week as ordered, pt. Aware to call sooner with any changes or questions/concerns. Discharge instructions reviewed with patient, all questions answered, copy given to patient. Dressings were applied to all wounds per M.D. Instructions at this visit.

## 2022-12-04 NOTE — PROGRESS NOTES
[facility] Progress Note    Yue Ontiveros     : 1950    DATE OF VISIT:  2022    Subjective:     Yue Ontiveros is a 67 y.o. female who has a dehisced surgical ulcer located on the right, posterior (Achilles area of the) ankle. Significant symptoms or pertinent wound history since last visit: feeling well, little pain, little drainage, no fever. Additional ulcer(s) noted? no.      Her current medication list consists of Biotin, Vitamin D3, amitriptyline, aspirin, atorvastatin, fenofibrate, gabapentin, glimepiride, hydroCHLOROthiazide, irbesartan, metFORMIN (MOD), omeprazole, pioglitazone, polyethylene glycol, and vitamin B-12. Allergies: Adhesive tape    Objective:     Vitals:    22 0929   BP: (!) 147/75   Pulse: (!) 103   Resp: 18   Temp: 97.9 °F (36.6 °C)   TempSrc: Oral   Weight: 173 lb 12.8 oz (78.8 kg)   Height: 5' 3\" (1.6 m)     Constitutional:  well-developed, well-nourished, NAD   Psychiatric:  oriented to person, place and time; mood and affect appropriate for the situation   Cardiovascular:  bilateral pedal pulses palpable, feet warm, good cap refill; no lower extremity edema  Lymphatic:  no inguinal or popliteal adenopathy, no angitis or cellulitis  Musculoskeletal:  no clubbing, cyanosis or petechiae; RLE and LLE with no gross effusions, joint misalignment or acute arthritis  Ivanna-ulcer skin: less indurated, pink, warm, dry, less hyperkeratotic. Ulcer(s):  small oval wound, a couple of mm deep, a bit more granular and less fibronecrotic, no signs of infection, some fibrin, presumed biofilm, no SQ necrosis now, just a bit smaller - changes are slight and slow to develop, but noticeable. Photos also saved in electronic chart.     Today's wound measurements, per RN documentation:  Wound 22 #1, Right Achilles, Non-healing Surgical Wound, Full Thickness, Onset 2021-Wound Length (cm): 0.5 cm    Wound 22 #1, Right Achilles, Non-healing Surgical Wound, Full Thickness, Onset 7/2021-Wound Width (cm): 0.3 cm    Wound 09/23/22 #1, Right Achilles, Non-healing Surgical Wound, Full Thickness, Onset 7/2021-Wound Depth (cm): 0.1 cm    Assessment:     Patient Active Problem List   Diagnosis Code    Carpal tunnel syndrome, right G56.01    DM type 2 (diabetes mellitus, type 2) ([facility]) E11.9    Essential hypertension I10    Gastroesophageal reflux disease K21.9    Hemochromatosis carrier Z14.8    Insomnia with sleep apnea G47.00, G47.30    Localized osteoarthrosis M19.90    Other and unspecified hyperlipidemia E78.5    Class 1 obesity due to excess calories with serious comorbidity and body mass index (BMI) of 30.0 to 30.9 in adult E66.09, Z68.30    Obstructive sleep apnea on CPAP G47.33, Z99.89    Spinal stenosis of lumbar region without neurogenic claudication M48.061    Spondylolisthesis M43.10    Trigger middle finger of right hand M65.331    Surgical wound dehiscence, initial encounter T81.31XA       Assessment of today's active condition(s): Hx Achilles tendon rupture, repair, small surgical dehiscence, no active care for some time, making some progress now with debridement, brief immobilization, a few JOAQUÍN dressings, now simpler daily dressings at home. Factors contributing to occurrence and/or persistence of the chronic ulcer include diabetes and shear force. Medical necessity of today's visit is shown by the above documentation. Sharp debridement is indicated today, based upon the exam findings in the wound(s) above. Procedure note:     Consent obtained. Time out performed per [facility] policy. Anesthetic  Anesthetic: 4% Lidocaine Cream     Using a curette and #15 blade scalpel, I sharply debrided the right, posterior (Achilles area of the) ankle ulcer(s) down through and including the removal of dermis. The type(s) of tissue debrided included fibrin and biofilm. Total Surface Area Debrided: 1 sq cm.     The ulcers were then irrigated with normal saline solution. The procedure was completed with a small amount of bleeding, and hemostasis was with pressure. The patient tolerated the procedure well, with no significant complications. The patient's level of pain during and after the procedure was monitored. Post-debridement measurements, if different from pre-debridement, are in the flowsheet as well. Discharge plan:     Treatment in the wound care center today, per RN documentation: Wound 09/23/22 #1, Right Achilles, Non-healing Surgical Wound, Full Thickness, Onset 7/2021-Dressing/Treatment: Other (comment) (multidex, purachol, 4x4, kerlix to hold in place). No boot needed, as long as she doesn't have any pain while walking. Home treatment: good handwashing before and after any dressing changes. Cleanse wound with saline or soap & water before dressing change. May use Vaseline (petrolatum), Aquaphor, Aveeno, CeraVe, Cetaphil, Eucerin, Lubriderm, etc for dry skin. Dressing type for home: Hypochlorous acid spray, Multidex, Yuliana, and Dry cover dressing, once daily. Written discharge instructions given to patient. Follow up in 1 week.     Electronically signed by Nika Cruz MD on 12/4/2022 at 2:20 PM.

## 2022-12-04 NOTE — PROGRESS NOTES
[facility] Progress Note    Álvaro Velasquez     : 1950    DATE OF VISIT:  2022    Subjective:     Álvaro Velasquez is a 67 y.o. female who has a dehisced surgical ulcer located on the right, posterior (Achilles area of the) ankle. Significant symptoms or pertinent wound history since last visit: feeling well, no pain, little drainage, no fever, doing well with dressings. Additional ulcer(s) noted? no.      Her current medication list consists of Biotin, Vitamin D3, amitriptyline, aspirin, atorvastatin, fenofibrate, gabapentin, glimepiride, hydroCHLOROthiazide, irbesartan, metFORMIN (MOD), omeprazole, pioglitazone, polyethylene glycol, and vitamin B-12. Allergies: Adhesive tape    Objective:     Vitals:    22 1130   BP: 137/76   Pulse: 89   Resp: 18   Temp: 97.8 °F (36.6 °C)   TempSrc: Oral   Weight: 172 lb 3.2 oz (78.1 kg)   Height: 5' 3\" (1.6 m)     Constitutional:  well-developed, well-nourished, NAD   Psychiatric:  oriented to person, place and time; mood and affect appropriate for the situation   Cardiovascular:  bilateral pedal pulses palpable, feet warm, good cap refill; no lower extremity edema  Lymphatic:  no inguinal or popliteal adenopathy, no angitis or cellulitis  Musculoskeletal:  no clubbing, cyanosis or petechiae; RLE and LLE with no gross effusions, joint misalignment or acute arthritis  Ivanna-ulcer skin: less indurated, pink, warm, dry, less hyperkeratotic. Ulcer(s):  small oval wound, slowly but steadily more shallow, a bit smaller in area, a bit more granular, a bit less fibrotic, some fibrin and biofilm, no signs of infection. Photos also saved in electronic chart.     Today's wound measurements, per RN documentation:  Wound 22 #1, Right Achilles, Non-healing Surgical Wound, Full Thickness, Onset 2021-Wound Length (cm): 0.6 cm    Wound 22 #1, Right Achilles, Non-healing Surgical Wound, Full Thickness, Onset 2021-Wound Width (cm): 0.3 cm    Wound 09/23/22 #1, Right Achilles, Non-healing Surgical Wound, Full Thickness, Onset 7/2021-Wound Depth (cm): 0.1 cm    Assessment:     Patient Active Problem List   Diagnosis Code    Carpal tunnel syndrome, right G56.01    DM type 2 (diabetes mellitus, type 2) ([facility] [address] E11.9    Essential hypertension I10    Gastroesophageal reflux disease K21.9    Hemochromatosis carrier Z14.8    Insomnia with sleep apnea G47.00, G47.30    Localized osteoarthrosis M19.90    Other and unspecified hyperlipidemia E78.5    Class 1 obesity due to excess calories with serious comorbidity and body mass index (BMI) of 30.0 to 30.9 in adult E66.09, Z68.30    Obstructive sleep apnea on CPAP G47.33, Z99.89    Spinal stenosis of lumbar region without neurogenic claudication M48.061    Spondylolisthesis M43.10    Trigger middle finger of right hand M65.331    Surgical wound dehiscence, initial encounter T81.31XA       Assessment of today's active condition(s): Hx Achilles rupture, repair, small dehiscence, persistent for many months without a lot of active local care, making slow but steady progress now, no signs of tendon involvement in the wound bed, or any infection or mechanical strain on the wound. Factors contributing to occurrence and/or persistence of the chronic ulcer include diabetes and shear force. Medical necessity of today's visit is shown by the above documentation. Sharp debridement is indicated today, based upon the exam findings in the wound(s) above. Procedure note:     Consent obtained. Time out performed per [facility] policy. Anesthetic  Anesthetic: 4% Lidocaine Cream     Using a curette and #15 blade scalpel, I sharply debrided the right, posterior (Achilles area of the) ankle ulcer(s) down through and including the removal of dermis. The type(s) of tissue debrided included fibrin and biofilm. Total Surface Area Debrided: 1 sq cm. The ulcers were then irrigated with normal saline solution.  The procedure was completed with a small amount of bleeding, and hemostasis was with pressure. The patient tolerated the procedure well, with no significant complications. The patient's level of pain during and after the procedure was monitored. Post-debridement measurements, if different from pre-debridement, are in the flowsheet as well. Discharge plan:     Treatment in the wound care center today, per RN documentation: Wound 09/23/22 #1, Right Achilles, Non-healing Surgical Wound, Full Thickness, Onset 7/2021-Dressing/Treatment: Other (comment) (Vashe,Multidex,Purachol,Dry Dressing). Home treatment: good handwashing before and after any dressing changes. Cleanse wound with saline or soap & water before dressing change. May use Vaseline (petrolatum), Aquaphor, Aveeno, CeraVe, Cetaphil, Eucerin, Lubriderm, etc for dry skin. Dressing type for home: as above, once daily. Written discharge instructions given to patient. Follow up in 1 week. I think the continued weekly debridements, though they've not needed to be aggressive or extensive, will continue to push this toward healing.      Electronically signed by Christian Nelson MD on 12/4/2022 at 2:24 PM.

## 2022-12-05 NOTE — DISCHARGE INSTRUCTIONS
215 [facility] Physician Orders and Discharge 800 [facility]  [address], [address]  [place], [facility]  Telephone: [phone]      Fax: [phone]        Your home care company:   N/a     Your wound-care supplies will be provided by:  Patient     NAME:  Acosta Atkins   YOB: 1950  PRIMARY DIAGNOSIS FOR WOUND CARE CENTER:  Non-healing surgical wound     Wound cleansing:   Do not scrub or use excessive force. Wash hands with soap and water before and after dressing changes. Prior to applying a clean dressing, cleanse wound with normal saline, wound cleanser, or mild soap and water. Ask your physician or nurse before getting the wound(s) wet in the shower. Wound care for home:     Right Achilles Wound:  Vashe to wound after cleansing wound spray 2-3 sprays into wound let sit about 60 seconds, do not rinse then apply your dressing   Multidex - you may purchase this in the pharmacy down the [address]aga  Purachol   Dry Dressing   Change daily      Keep dressing clean, dry and in place for the week            Please note, all wounds (unless stated otherwise here) were mechanically debrided at the time of cleansing here in the wound-care center today, so a small amount of pain, drainage or bleeding from that process might be expected, and is normal.      All products for home use, including multiple products for a single wound if applicable, are medically necessary in order to achieve the best chance at timely wound healing. See provider documentation for details if needed.      Substituted dressings applied in the [address] [place] [address],[address] today, if applicable:     N/a     New orders for this week (labs, imaging, medications, etc.):  Ok to stop wearing boot as long as you are not having pain      Additional instructions for specific diagnoses:     N/a           F/U Appointment is with Dr. Felicia Paredes in 1 week on                                   at .     Your nurse  is Gracie Weston RN      If we applied slip-resistant hospital socks today, be sure to remove them at least once a day to inspect your toes or feet, even if you're not changing the wraps or dressings underneath. If you see anything concerning (redness, excess moisture, etc), please call and let us know right away.      Should you experience any significant changes in your wound(s) (including redness, increased warmth, increased pain, increased drainage, odor, or fever) or have questions about your wound care, please contact the [address] [place] [address] at [phone] Monday-Thursday from 8:00 am - 4:30 pm, or Friday from 8:00 am - 2:30 pm.  If you need help with your wound outside these hours and cannot wait until we are again available, contact your home-care company (if applicable), your PCP, or go to the nearest emergency room

## 2022-12-09 ENCOUNTER — HOSPITAL ENCOUNTER (OUTPATIENT)
Dept: WOUND CARE | Age: 72
Discharge: HOME | End: 2022-12-09
Payer: MEDICARE

## 2022-12-09 VITALS
BODY MASS INDEX: 30.65 KG/M2 | SYSTOLIC BLOOD PRESSURE: 148 MMHG | TEMPERATURE: 98.1 F | HEIGHT: 63 IN | WEIGHT: 173 LBS | HEART RATE: 106 BPM | DIASTOLIC BLOOD PRESSURE: 71 MMHG | RESPIRATION RATE: 17 BRPM

## 2022-12-09 DIAGNOSIS — T81.31XA SURGICAL WOUND DEHISCENCE, INITIAL ENCOUNTER: Primary | ICD-10-CM

## 2022-12-09 PROCEDURE — 97597 DBRDMT OPN WND 1ST 20 CM/<: CPT

## 2022-12-09 RX ORDER — LIDOCAINE 40 MG/G
CREAM TOPICAL ONCE
Status: DISCONTINUED | OUTPATIENT
Start: 2022-12-09 | End: 2022-12-10 | Stop reason: HOSPADM

## 2022-12-09 RX ORDER — LIDOCAINE 50 MG/G
OINTMENT TOPICAL ONCE
OUTPATIENT
Start: 2022-12-09 | End: 2022-12-09

## 2022-12-09 RX ORDER — LIDOCAINE HYDROCHLORIDE 40 MG/ML
SOLUTION TOPICAL ONCE
OUTPATIENT
Start: 2022-12-09 | End: 2022-12-09

## 2022-12-09 RX ORDER — LIDOCAINE 40 MG/G
CREAM TOPICAL ONCE
OUTPATIENT
Start: 2022-12-09 | End: 2022-12-09

## 2022-12-09 RX ORDER — BACITRACIN ZINC AND POLYMYXIN B SULFATE 500; 1000 [USP'U]/G; [USP'U]/G
OINTMENT TOPICAL ONCE
OUTPATIENT
Start: 2022-12-09 | End: 2022-12-09

## 2022-12-09 NOTE — PLAN OF CARE
Patient seen in [facility] today for follow up. She states she is feeling well overall. Debridement per Dr. Ping Mays. Patient tolerated well. Wounds showing signs of healing. Plan of care will continue this week. F/u in [facility] in 1 week as ordered, pt. Aware to call sooner with any changes or questions/concerns. Discharge instructions reviewed with patient, all questions answered, copy given to patient. Dressings were applied to all wounds per M.D. Instructions at this visit.

## 2022-12-12 NOTE — DISCHARGE INSTRUCTIONS
215 [facility] Physician Orders and Discharge 800 [facility]  [address], [address]  [place], [facility]  Telephone: [phone]      Fax: [phone]        Your home care company:   N/a     Your wound-care supplies will be provided by:  Patient     NAME:  Zelda Barnes   YOB: 1950  PRIMARY DIAGNOSIS FOR WOUND CARE CENTER:  Non-healing surgical wound     Wound cleansing:   Do not scrub or use excessive force. Wash hands with soap and water before and after dressing changes. Prior to applying a clean dressing, cleanse wound with normal saline, wound cleanser, or mild soap and water. Ask your physician or nurse before getting the wound(s) wet in the shower. Wound care for home:     Right Achilles Wound:  Vashe to wound after cleansing wound spray 2-3 sprays into wound let sit about 60 seconds, do not rinse then apply your dressing   Multidex - you may purchase this in the pharmacy down the [address]  Dry Dressing   Change daily      If wound still seems dry, mix multidex and hydrogel - provided at today's visit - and place wound     Keep dressing clean, dry and in place for the week            Please note, all wounds (unless stated otherwise here) were mechanically debrided at the time of cleansing here in the wound-care center today, so a small amount of pain, drainage or bleeding from that process might be expected, and is normal.      All products for home use, including multiple products for a single wound if applicable, are medically necessary in order to achieve the best chance at timely wound healing. See provider documentation for details if needed.      Substituted dressings applied in the [facility] today, if applicable:     N/a     New orders for this week (labs, imaging, medications, etc.):    Stop collagen today - If wound still seems dry, mix multidex and hydrogel - provided at today's visit - and place wound   Ok to stop wearing boot as long as you are not having pain      Additional instructions for specific diagnoses:     N/a           F/U Appointment is with Dr. Ramona Aviles in 1 week on                                   at                       .     Your nurse  is Sam Bright RN      If we applied slip-resistant hospital socks today, be sure to remove them at least once a day to inspect your toes or feet, even if you're not changing the wraps or dressings underneath. If you see anything concerning (redness, excess moisture, etc), please call and let us know right away.      Should you experience any significant changes in your wound(s) (including redness, increased warmth, increased pain, increased drainage, odor, or fever) or have questions about your wound care, please contact the [address] [place] [address] at [phone] Monday-Thursday from 8:00 am - 4:30 pm, or Friday from 8:00 am - 2:30 pm.  If you need help with your wound outside these hours and cannot wait until we are again available, contact your home-care company (if applicable), your PCP, or go to the nearest emergency room

## 2022-12-14 NOTE — PROGRESS NOTES
[facility] Progress Note    Frances Marino     : 1950    DATE OF VISIT:  2022    Subjective:     Frances Marino is a 67 y.o. female who has a dehisced surgical ulcer located on the right, posterior (Achilles area of the) ankle. Significant symptoms or pertinent wound history since last visit: feeling well overall, very little pain or pruritus, scant drainage, minimal focal swelling, no fever. Additional ulcer(s) noted? no.      Her current medication list consists of Biotin, Vitamin D3, amitriptyline, aspirin, atorvastatin, fenofibrate, gabapentin, glimepiride, hydroCHLOROthiazide, irbesartan, metFORMIN (MOD), omeprazole, pioglitazone, polyethylene glycol, and vitamin B-12. Allergies: Adhesive tape    Objective:     Vitals:    22 1022 22 1025   BP: (!) 148/71    Pulse: (!) 106    Resp: 17    Temp: 98.1 °F (36.7 °C)    TempSrc: Oral    Weight:  173 lb (78.5 kg)   Height: 5' 3\" (1.6 m)      Constitutional:  well-developed, well-nourished, NAD   Psychiatric:  oriented to person, place and time; mood and affect appropriate for the situation   Cardiovascular:  bilateral pedal pulses palpable, feet warm, good cap refill; no lower extremity edema  Lymphatic:  no inguinal or popliteal adenopathy, no angitis or cellulitis  Musculoskeletal:  no clubbing, cyanosis or petechiae; RLE and LLE with no gross effusions, joint misalignment or acute arthritis, just a bit of chronic indurated swelling around the Achilles tendon itself  Ivanna-ulcer skin: less indurated, pink, warm, dry, still a bit hyperkeratotic. Ulcer(s):  small oval wound, slowly but steadily more shallow, a bit smaller in area, a bit more granular, a bit less fibrotic, some fibrin and biofilm, no signs of infection -- changes week to week aren't great, but from a few weeks ago, they're clear. Photos also saved in electronic chart.     Today's wound measurements, per RN documentation:  Wound 22 #1, Right Achilles, Non-healing Surgical Wound, Full Thickness, Onset 7/2021-Wound Length (cm): 0.3 cm    Wound 09/23/22 #1, Right Achilles, Non-healing Surgical Wound, Full Thickness, Onset 7/2021-Wound Width (cm): 0.3 cm    Wound 09/23/22 #1, Right Achilles, Non-healing Surgical Wound, Full Thickness, Onset 7/2021-Wound Depth (cm): 0.1 cm    Assessment:     Patient Active Problem List   Diagnosis Code    Carpal tunnel syndrome, right G56.01    DM type 2 (diabetes mellitus, type 2) ([facility]) E11.9    Essential hypertension I10    Gastroesophageal reflux disease K21.9    Hemochromatosis carrier Z14.8    Insomnia with sleep apnea G47.00, G47.30    Localized osteoarthrosis M19.90    Other and unspecified hyperlipidemia E78.5    Class 1 obesity due to excess calories with serious comorbidity and body mass index (BMI) of 30.0 to 30.9 in adult E66.09, Z68.30    Obstructive sleep apnea on CPAP G47.33, Z99.89    Spinal stenosis of lumbar region without neurogenic claudication M48.061    Spondylolisthesis M43.10    Trigger middle finger of right hand M65.331    Surgical wound dehiscence, initial encounter T81.31XA       Assessment of today's active condition(s): Hx Achilles tendon rupture, repair, small area of prolonged dehiscence, making slow but steady progress with careful debridement, initial immobilization, JOAQUNÍ NPWT for a few weeks, now simpler daily dressings; no signs of ischemia, infection, any ongoing pressure or shear. Factors contributing to occurrence and/or persistence of the chronic ulcer include diabetes and shear force. Medical necessity of today's visit is shown by the above documentation. Sharp debridement is indicated today, based upon the exam findings in the wound(s) above. Procedure note:     Consent obtained. Time out performed per [facility] policy.     Anesthetic  Anesthetic: 4% Lidocaine Cream     Using a #15 blade scalpel, I sharply debrided the right, posterior (Achilles area of the) ankle ulcer(s) down through and including the removal of dermis. The type(s) of tissue debrided included fibrin, biofilm, and necrotic/eschar. Total Surface Area Debrided: 1 sq cm. The ulcers were then irrigated with normal saline solution. The procedure was completed with a small amount of bleeding, and hemostasis was with pressure. The patient tolerated the procedure well, with no significant complications. The patient's level of pain during and after the procedure was monitored. Post-debridement measurements, if different from pre-debridement, are in the flowsheet as well. Discharge plan:     Treatment in the wound care center today, per RN documentation: Wound 09/23/22 #1, Right Achilles, Non-healing Surgical Wound, Full Thickness, Onset 7/2021-Dressing/Treatment: Other (comment) (Multidex,Collagen,Dry dressing,Tape). Ok to walk without the immobilizer boot as long as it doesn't cause pain, and as long as the wound continues to improve. Home treatment: good handwashing before and after any dressing changes. Cleanse wound with saline or soap & water before dressing change. May use Vaseline (petrolatum), Aquaphor, Aveeno, CeraVe, Cetaphil, Eucerin, Lubriderm, etc for dry skin. Dressing type for home: as above, once daily. Written discharge instructions given to patient. Follow up in 1 week.     Electronically signed by Maryuri Pascual MD on 12/13/2022 at 7:57 PM.

## 2022-12-16 ENCOUNTER — HOSPITAL ENCOUNTER (OUTPATIENT)
Dept: WOUND CARE | Age: 72
Discharge: HOME | End: 2022-12-16
Payer: MEDICARE

## 2022-12-16 VITALS
RESPIRATION RATE: 18 BRPM | BODY MASS INDEX: 30.41 KG/M2 | HEART RATE: 91 BPM | WEIGHT: 171.6 LBS | TEMPERATURE: 97.9 F | HEIGHT: 63 IN | DIASTOLIC BLOOD PRESSURE: 74 MMHG | SYSTOLIC BLOOD PRESSURE: 134 MMHG

## 2022-12-16 DIAGNOSIS — T81.31XA SURGICAL WOUND DEHISCENCE, INITIAL ENCOUNTER: Primary | ICD-10-CM

## 2022-12-16 PROCEDURE — 97597 DBRDMT OPN WND 1ST 20 CM/<: CPT

## 2022-12-16 RX ORDER — LIDOCAINE HYDROCHLORIDE 40 MG/ML
SOLUTION TOPICAL ONCE
OUTPATIENT
Start: 2022-12-16 | End: 2022-12-16

## 2022-12-16 RX ORDER — LIDOCAINE 40 MG/G
CREAM TOPICAL ONCE
Status: DISCONTINUED | OUTPATIENT
Start: 2022-12-16 | End: 2022-12-17 | Stop reason: HOSPADM

## 2022-12-16 RX ORDER — BACITRACIN ZINC AND POLYMYXIN B SULFATE 500; 1000 [USP'U]/G; [USP'U]/G
OINTMENT TOPICAL ONCE
OUTPATIENT
Start: 2022-12-16 | End: 2022-12-16

## 2022-12-16 RX ORDER — LIDOCAINE 50 MG/G
OINTMENT TOPICAL ONCE
OUTPATIENT
Start: 2022-12-16 | End: 2022-12-16

## 2022-12-16 RX ORDER — LIDOCAINE 40 MG/G
CREAM TOPICAL ONCE
OUTPATIENT
Start: 2022-12-16 | End: 2022-12-16

## 2022-12-16 NOTE — PLAN OF CARE
Patient seen in [address] [place] [address],[address] for follow up. She reports her wound bed has been dry - minimal drainage. Patient reports wound bed looks Myrle Score' at dressing change. Debridement per Dr. Randa Hugo. Patient tolerated well. We will stop collagen today. F/u in [address] [place] [address],[address] in 1 week as ordered, pt. Aware to call sooner with any changes or questions/concerns. Discharge instructions reviewed with patient, all questions answered, copy given to patient. Dressings were applied to all wounds per M.D. Instructions at this visit.

## 2022-12-19 NOTE — DISCHARGE INSTRUCTIONS
215 [facility] Physician Orders and Discharge [address], [address]  [place], [facility]  Telephone: [phone]      Fax: [phone]        Your home care company:   N/a     Your wound-care supplies will be provided by:  Patient     NAME:  Jessica Escobar   YOB: 1950  PRIMARY DIAGNOSIS FOR WOUND CARE CENTER:  Non-healing surgical wound     Wound cleansing:   Do not scrub or use excessive force. Wash hands with soap and water before and after dressing changes. Prior to applying a clean dressing, cleanse wound with normal saline, wound cleanser, or mild soap and water. Ask your physician or nurse before getting the wound(s) wet in the shower. Wound care for home:     Right Achilles Wound:  Vashe to wound after cleansing wound spray 2-3 sprays into wound let sit about 60 seconds, do not rinse then apply your dressing   Multidex - you may purchase this in the pharmacy down the [address]  Dry Dressing   Change daily      If wound still seems dry, mix multidex and hydrogel - provided at today's visit - and place wound      Keep dressing clean, dry and in place for the week            Please note, all wounds (unless stated otherwise here) were mechanically debrided at the time of cleansing here in the wound-care center today, so a small amount of pain, drainage or bleeding from that process might be expected, and is normal.      All products for home use, including multiple products for a single wound if applicable, are medically necessary in order to achieve the best chance at timely wound healing. See provider documentation for details if needed.      Substituted dressings applied in the [address] [place] [address],[address] today, if applicable:     N/a     New orders for this week (labs, imaging, medications, etc.):     Stop collagen today - If wound still seems dry, mix multidex and hydrogel - provided at today's visit - and place wound   Ok to stop wearing boot as long as you are not having pain      Additional instructions for specific diagnoses:     N/a           F/U Appointment is with Dr. Radha Swanson in 1 week on                                   at                       .     Your nurse  is Taylor Love RN      If we applied slip-resistant hospital socks today, be sure to remove them at least once a day to inspect your toes or feet, even if you're not changing the wraps or dressings underneath. If you see anything concerning (redness, excess moisture, etc), please call and let us know right away.      Should you experience any significant changes in your wound(s) (including redness, increased warmth, increased pain, increased drainage, odor, or fever) or have questions about your wound care, please contact the [employer] at [phone] Monday-Thursday from 8:00 am - 4:30 pm, or Friday from 8:00 am - 2:30 pm.  If you need help with your wound outside these hours and cannot wait until we are again available, contact your home-care company (if applicable), your PCP, or go to the nearest emergency room

## 2022-12-23 ENCOUNTER — HOSPITAL ENCOUNTER (OUTPATIENT)
Dept: WOUND CARE | Age: 72
Discharge: HOME | End: 2022-12-23
Payer: MEDICARE

## 2022-12-23 DIAGNOSIS — T81.31XA SURGICAL WOUND DEHISCENCE, INITIAL ENCOUNTER: Primary | ICD-10-CM

## 2022-12-23 PROCEDURE — 99211 OFF/OP EST MAY X REQ PHY/QHP: CPT | Performed by: INTERNAL MEDICINE

## 2022-12-27 NOTE — DISCHARGE INSTRUCTIONS
215 [facility] Physician Orders and Discharge 800 [facility]  [address], [address]  [place], [facility]  Telephone: [phone]      Fax: [phone]        Your home care company:   N/a     Your wound-care supplies will be provided by:  Patient     NAME:  Leilani Malcolm   YOB: 1950  PRIMARY DIAGNOSIS FOR WOUND CARE CENTER:  Non-healing surgical wound     Wound cleansing:   Do not scrub or use excessive force. Wash hands with soap and water before and after dressing changes. Prior to applying a clean dressing, cleanse wound with normal saline, wound cleanser, or mild soap and water. Ask your physician or nurse before getting the wound(s) wet in the shower. Wound care for home:     Right Achilles Wound:  HEALED!!!!! Apply Aquaphor/Vaseline to area  No bandage needed          Please note, all wounds (unless stated otherwise here) were mechanically debrided at the time of cleansing here in the wound-care center today, so a small amount of pain, drainage or bleeding from that process might be expected, and is normal.      All products for home use, including multiple products for a single wound if applicable, are medically necessary in order to achieve the best chance at timely wound healing. See provider documentation for details if needed. Substituted dressings applied in the [address] [place] [address] Avenue,[address] today, if applicable:     N/a     New orders for this week (labs, imaging, medications, etc.):       Additional instructions for specific diagnoses:     N/a           F/U Appointment is with Dr. Lisa Kang as needed  Your nurse  is Meghan Gill RN      If we applied slip-resistant hospital socks today, be sure to remove them at least once a day to inspect your toes or feet, even if you're not changing the wraps or dressings underneath. If you see anything concerning (redness, excess moisture, etc), please call and let us know right away.      Should you experience any significant changes in your wound(s) (including redness, increased warmth, increased pain, increased drainage, odor, or fever) or have questions about your wound care, please contact the [place] [address] at [phone] Monday-Thursday from 8:00 am - 4:30 pm, or Friday from 8:00 am - 2:30 pm.  If you need help with your wound outside these hours and cannot wait until we are again available, contact your home-care company (if applicable), your PCP, or go to the nearest emergency room

## 2022-12-28 NOTE — PROGRESS NOTES
[facility] Progress Note    Álvaro Velasquez     : 1950    DATE OF VISIT:  2022    Subjective:     Álvaro Velasquez is a 67 y.o. female who has a dehisced surgical ulcer located on the right, posterior (Achilles area of the) ankle. Significant symptoms or pertinent wound history since last visit: feeling well overall, no fever, very mild occasional pain, no pruritus, scant drainage now, no swelling. Additional ulcer(s) noted? no.      Her current medication list consists of Biotin, Vitamin D3, amitriptyline, aspirin, atorvastatin, fenofibrate, gabapentin, glimepiride, hydroCHLOROthiazide, irbesartan, metFORMIN (MOD), omeprazole, pioglitazone, polyethylene glycol, and vitamin B-12. Allergies: Adhesive tape    Objective:     Vitals:    22 1039   BP: 134/74   Pulse: 91   Resp: 18   Temp: 97.9 °F (36.6 °C)   TempSrc: Oral   Weight: 171 lb 9.6 oz (77.8 kg)   Height: 5' 3\" (1.6 m)     Constitutional:  well-developed, well-nourished, NAD   Psychiatric:  oriented to person, place and time; mood and affect appropriate for the situation   Cardiovascular:  bilateral pedal pulses palpable, feet warm, good cap refill; no lower extremity edema  Lymphatic:  no inguinal or popliteal adenopathy, no angitis or cellulitis  Musculoskeletal:  no clubbing, cyanosis or petechiae; RLE and LLE with no gross effusions, joint misalignment or acute arthritis, just a bit of chronic indurated swelling around the Achilles tendon itself  Ivanna-ulcer skin: less indurated, pink, warm, dry, still a bit hyperkeratotic debris. Ulcer(s):  small oval wound, slowly but steadily more shallow, a bit smaller in area, a bit more granular, a bit less fibrotic, some fibrin and biofilm, no signs of infection, but it is more noticeably dry this week. Photos also saved in electronic chart.     Today's wound measurements, per RN documentation:  Wound 22 #1, Right Achilles, Non-healing Surgical Wound, Full Thickness, Onset 7/2021-Wound Length (cm): 0.3 cm    Wound 09/23/22 #1, Right Achilles, Non-healing Surgical Wound, Full Thickness, Onset 7/2021-Wound Width (cm): 0.2 cm    Wound 09/23/22 #1, Right Achilles, Non-healing Surgical Wound, Full Thickness, Onset 7/2021-Wound Depth (cm): 0.1 cm    Assessment:     Patient Active Problem List   Diagnosis Code    Carpal tunnel syndrome, right G56.01    DM type 2 (diabetes mellitus, type 2) ([facility]) E11.9    Essential hypertension I10    Gastroesophageal reflux disease K21.9    Hemochromatosis carrier Z14.8    Insomnia with sleep apnea G47.00, G47.30    Localized osteoarthrosis M19.90    Other and unspecified hyperlipidemia E78.5    Class 1 obesity due to excess calories with serious comorbidity and body mass index (BMI) of 30.0 to 30.9 in adult E66.09, Z68.30    Obstructive sleep apnea on CPAP G47.33, Z99.89    Spinal stenosis of lumbar region without neurogenic claudication M48.061    Spondylolisthesis M43.10    Trigger middle finger of right hand M65.331    Surgical wound dehiscence, initial encounter T81.31XA       Assessment of today's active condition(s): Hx Achilles tendon rupture, repair, segment of surgical wound dehiscence, very slowly making progress toward healing; no signs of ischemia, infection, pressure; I think if we get some more wound-bed moisture, it could make another small move forward. Factors contributing to occurrence and/or persistence of the chronic ulcer include edema, diabetes, and shear force. Medical necessity of today's visit is shown by the above documentation. Sharp debridement is indicated today, based upon the exam findings in the wound(s) above. Procedure note:     Consent obtained. Time out performed per [facility] policy. Anesthetic  Anesthetic: 4% Lidocaine Cream     Using a curette and #15 blade scalpel, I sharply debrided the right, posterior (Achilles area of the) ankle ulcer(s) down through and including the removal of dermis.  The type(s) of tissue debrided included fibrin, biofilm, and necrotic/eschar. Total Surface Area Debrided: 1 sq cm. The ulcers were then irrigated with normal saline solution. The procedure was completed with a small amount of bleeding, and hemostasis was with pressure. The patient tolerated the procedure well, with no significant complications. The patient's level of pain during and after the procedure was monitored. Post-debridement measurements, if different from pre-debridement, are in the flowsheet as well. Discharge plan:     Treatment in the wound care center today, per RN documentation: Wound 09/23/22 #1, Right Achilles, Non-healing Surgical Wound, Full Thickness, Onset 7/2021-Dressing/Treatment: Other (comment) (Vashe,Multidex, 4x4's, tasia,tape). I think the collagen, though not that absorptive, might be drying out the wound bed more than it's helping. Can switch to Multidex alone, or even thin that out with a bit of hydrogel if needed. Home treatment: good handwashing before and after any dressing changes. Cleanse wound with saline or soap & water before dressing change. May use Vaseline (petrolatum), Aquaphor, Aveeno, CeraVe, Cetaphil, Eucerin, Lubriderm, etc for dry skin. Dressing type for home: as above, every day or two, as needed. Written discharge instructions given to patient. Follow up in 2 weeks.     Electronically signed by Randell Arriaga MD on 12/28/2022 at 10:17 AM.

## 2022-12-30 ENCOUNTER — HOSPITAL ENCOUNTER (OUTPATIENT)
Dept: WOUND CARE | Age: 72
Discharge: HOME | End: 2022-12-30
Payer: MEDICARE

## 2022-12-30 VITALS
HEIGHT: 63 IN | WEIGHT: 172.4 LBS | SYSTOLIC BLOOD PRESSURE: 158 MMHG | DIASTOLIC BLOOD PRESSURE: 74 MMHG | TEMPERATURE: 97.7 F | HEART RATE: 92 BPM | RESPIRATION RATE: 18 BRPM | BODY MASS INDEX: 30.55 KG/M2

## 2022-12-30 DIAGNOSIS — T81.31XA SURGICAL WOUND DEHISCENCE, INITIAL ENCOUNTER: Primary | ICD-10-CM

## 2022-12-30 PROCEDURE — 99212 OFFICE O/P EST SF 10 MIN: CPT

## 2022-12-30 RX ORDER — LIDOCAINE HYDROCHLORIDE 40 MG/ML
SOLUTION TOPICAL ONCE
OUTPATIENT
Start: 2022-12-30 | End: 2022-12-30

## 2022-12-30 RX ORDER — LIDOCAINE 40 MG/G
CREAM TOPICAL ONCE
OUTPATIENT
Start: 2022-12-30 | End: 2022-12-30

## 2022-12-30 RX ORDER — LIDOCAINE 50 MG/G
OINTMENT TOPICAL ONCE
OUTPATIENT
Start: 2022-12-30 | End: 2022-12-30

## 2022-12-30 RX ORDER — LIDOCAINE 40 MG/G
CREAM TOPICAL ONCE
Status: DISCONTINUED | OUTPATIENT
Start: 2022-12-30 | End: 2022-12-31 | Stop reason: HOSPADM

## 2022-12-30 RX ORDER — BACITRACIN ZINC AND POLYMYXIN B SULFATE 500; 1000 [USP'U]/G; [USP'U]/G
OINTMENT TOPICAL ONCE
OUTPATIENT
Start: 2022-12-30 | End: 2022-12-30

## 2022-12-30 NOTE — PLAN OF CARE
Patient seen in [facility] today for follow up. Patient reports no issues with wound this week. Denies pain. Wound found to be healed. Patient educated on need to be diligent on moisturizing and checking area daily. She was instructed to call [facility] if wound should reopen. Voiced understanding to all. Discharge instructions reviewed with patient, all questions answered, copy given to patient. Dressings were applied to all wounds per M.D. Instructions at this visit.

## 2024-01-01 NOTE — PROGRESS NOTES
Nystatin called in   discuss side effects of that medicine. She'll go to physical therapy and follow up with me in 6 weeks.   We did discuss Achilles debridement [facility] tendon transfer

## 2025-07-15 ENCOUNTER — OFFICE VISIT (OUTPATIENT)
Dept: OBGYN CLINIC | Age: 75
End: 2025-07-15

## 2025-07-15 VITALS
SYSTOLIC BLOOD PRESSURE: 119 MMHG | HEIGHT: 62 IN | DIASTOLIC BLOOD PRESSURE: 79 MMHG | BODY MASS INDEX: 31.53 KG/M2 | HEART RATE: 116 BPM

## 2025-07-15 DIAGNOSIS — N76.0 ACUTE VAGINITIS: ICD-10-CM

## 2025-07-15 DIAGNOSIS — N90.89 VULVAR LESION: Primary | ICD-10-CM

## 2025-07-15 RX ORDER — CETIRIZINE HYDROCHLORIDE 10 MG/1
10 TABLET ORAL DAILY
COMMUNITY
Start: 2025-06-19 | End: 2026-06-19

## 2025-07-15 RX ORDER — ASCORBIC ACID 500 MG
TABLET ORAL
COMMUNITY
Start: 2024-11-04

## 2025-07-16 ENCOUNTER — TELEPHONE (OUTPATIENT)
Dept: OBGYN CLINIC | Age: 75
End: 2025-07-16

## 2025-07-16 DIAGNOSIS — N90.89 VULVAR LESION: Primary | ICD-10-CM

## 2025-07-16 LAB
BV BACTERIA DNA VAG QL NAA+PROBE: NOT DETECTED
C GLABRATA DNA VAG QL NAA+PROBE: NORMAL
C GLABRATA DNA VAG QL NAA+PROBE: NOT DETECTED
C KRUSEI DNA VAG QL NAA+PROBE: NOT DETECTED
CANDIDA DNA VAG QL NAA+PROBE: NOT DETECTED
T VAGINALIS DNA VAG QL NAA+PROBE: NOT DETECTED

## 2025-07-16 NOTE — TELEPHONE ENCOUNTER
Received call from lab. HSV rejected as this was collected in Viral transport media.  They WILL be able to run if order sent as lab 917 send out to [facility]. HSV by PCR. Lab pended and given ok to change order.  Routing as DIPAK

## 2025-07-19 LAB
HSV1 DNA CSF QL NAA+PROBE: NOT DETECTED
HSV2 DNA CSF QL NAA+PROBE: NOT DETECTED
SPECIMEN SOURCE: NORMAL

## 2025-07-22 ENCOUNTER — OFFICE VISIT (OUTPATIENT)
Dept: OBGYN CLINIC | Age: 75
End: 2025-07-22

## 2025-07-22 VITALS
OXYGEN SATURATION: 98 % | SYSTOLIC BLOOD PRESSURE: 148 MMHG | DIASTOLIC BLOOD PRESSURE: 78 MMHG | HEIGHT: 62 IN | WEIGHT: 172 LBS | HEART RATE: 88 BPM | BODY MASS INDEX: 31.65 KG/M2

## 2025-07-22 DIAGNOSIS — N90.89 VULVAR LESION: Primary | ICD-10-CM

## 2025-07-22 SDOH — ECONOMIC STABILITY: FOOD INSECURITY: WITHIN THE PAST 12 MONTHS, YOU WORRIED THAT YOUR FOOD WOULD RUN OUT BEFORE YOU GOT MONEY TO BUY MORE.: NEVER TRUE

## 2025-07-22 SDOH — ECONOMIC STABILITY: TRANSPORTATION INSECURITY
IN THE PAST 12 MONTHS, HAS THE LACK OF TRANSPORTATION KEPT YOU FROM MEDICAL APPOINTMENTS OR FROM GETTING MEDICATIONS?: NO

## 2025-07-22 SDOH — ECONOMIC STABILITY: FOOD INSECURITY: WITHIN THE PAST 12 MONTHS, THE FOOD YOU BOUGHT JUST DIDN'T LAST AND YOU DIDN'T HAVE MONEY TO GET MORE.: NEVER TRUE

## 2025-07-22 SDOH — ECONOMIC STABILITY: INCOME INSECURITY: IN THE LAST 12 MONTHS, WAS THERE A TIME WHEN YOU WERE NOT ABLE TO PAY THE MORTGAGE OR RENT ON TIME?: NO

## 2025-07-22 SDOH — ECONOMIC STABILITY: TRANSPORTATION INSECURITY
IN THE PAST 12 MONTHS, HAS LACK OF TRANSPORTATION KEPT YOU FROM MEETINGS, WORK, OR FROM GETTING THINGS NEEDED FOR DAILY LIVING?: NO

## 2025-07-22 ASSESSMENT — PATIENT HEALTH QUESTIONNAIRE - PHQ9
SUM OF ALL RESPONSES TO PHQ QUESTIONS 1-9: 0
SUM OF ALL RESPONSES TO PHQ9 QUESTIONS 1 & 2: 0
SUM OF ALL RESPONSES TO PHQ QUESTIONS 1-9: 0
1. LITTLE INTEREST OR PLEASURE IN DOING THINGS: NOT AT ALL
SUM OF ALL RESPONSES TO PHQ QUESTIONS 1-9: 0
2. FEELING DOWN, DEPRESSED OR HOPELESS: NOT AT ALL
2. FEELING DOWN, DEPRESSED OR HOPELESS: NOT AT ALL
1. LITTLE INTEREST OR PLEASURE IN DOING THINGS: NOT AT ALL
SUM OF ALL RESPONSES TO PHQ QUESTIONS 1-9: 0

## 2025-07-22 NOTE — PROGRESS NOTES
VULVAR/PERINEAL BIOPSY OR EXCISION    Date:  2025    Name:  aLuren Gonzalez     :  1950    Age:  74 y.o.    Reason for procedure:  vulvar lesion        Patient was placed in the lithotomy position.   The area on the left vulva was cleansed with betadine, then infiltrated with  2% xylocaine with epinephrine. Punch biopsy was performed with 2mm.  The area was not sutured. Silver nitrate was used for hemostasis.    Small dressing was not necessary.  Patient tolerated procedure well.  Patient was given care instructions and told to call for severe swelling, pain or fever.      Lauren was seen today for procedure.    Diagnoses and all orders for this visit:    Vulvar lesion     Will call with results    No follow-ups on file.     DARYA MORALES MD